# Patient Record
Sex: FEMALE
[De-identification: names, ages, dates, MRNs, and addresses within clinical notes are randomized per-mention and may not be internally consistent; named-entity substitution may affect disease eponyms.]

---

## 2017-03-27 ENCOUNTER — HOSPITAL ENCOUNTER (OUTPATIENT)
Dept: HOSPITAL 42 - ED | Age: 72
Setting detail: OBSERVATION
LOS: 1 days | Discharge: ANOTHER HOSPITAL | End: 2017-03-28
Attending: INTERNAL MEDICINE | Admitting: INTERNAL MEDICINE
Payer: COMMERCIAL

## 2017-03-27 VITALS — OXYGEN SATURATION: 98 %

## 2017-03-27 VITALS — BODY MASS INDEX: 28.3 KG/M2

## 2017-03-27 DIAGNOSIS — F41.9: ICD-10-CM

## 2017-03-27 DIAGNOSIS — I34.0: Primary | ICD-10-CM

## 2017-03-27 DIAGNOSIS — I48.2: ICD-10-CM

## 2017-03-27 DIAGNOSIS — R06.09: ICD-10-CM

## 2017-03-27 DIAGNOSIS — Z79.01: ICD-10-CM

## 2017-03-27 DIAGNOSIS — I25.10: ICD-10-CM

## 2017-03-27 DIAGNOSIS — I50.9: ICD-10-CM

## 2017-03-27 DIAGNOSIS — M79.7: ICD-10-CM

## 2017-03-27 DIAGNOSIS — D64.9: ICD-10-CM

## 2017-03-27 DIAGNOSIS — I11.0: ICD-10-CM

## 2017-03-27 DIAGNOSIS — I27.2: ICD-10-CM

## 2017-03-27 DIAGNOSIS — Z79.82: ICD-10-CM

## 2017-03-27 DIAGNOSIS — N28.9: ICD-10-CM

## 2017-03-27 DIAGNOSIS — K76.1: ICD-10-CM

## 2017-03-27 LAB
ADD MANUAL DIFF?: NO
ALBUMIN/GLOB SERPL: 1.2 {RATIO} (ref 1.1–1.8)
ALP SERPL-CCNC: 192 U/L (ref 38–133)
ALT SERPL-CCNC: 299 U/L (ref 7–56)
APPEARANCE UR: CLEAR
APTT BLD: 28.6 SECONDS (ref 23.7–30.8)
AST SERPL-CCNC: 242 U/L (ref 15–39)
BASOPHILS # BLD AUTO: 0.03 K/MM3 (ref 0–2)
BASOPHILS NFR BLD: 0.6 % (ref 0–3)
BILIRUB SERPL-MCNC: 0.5 MG/DL (ref 0.2–1.3)
BILIRUB UR-MCNC: NEGATIVE MG/DL
BUN SERPL-MCNC: 26 MG/DL (ref 7–21)
CALCIUM SERPL-MCNC: 8.5 MG/DL (ref 8.4–10.5)
CHLORIDE SERPL-SCNC: 104 MMOL/L (ref 98–107)
CO2 SERPL-SCNC: 22 MMOL/L (ref 21–33)
COLOR UR: YELLOW
EOSINOPHIL # BLD: 0.2 10*3/UL (ref 0–0.7)
EOSINOPHIL NFR BLD: 3.2 % (ref 1.5–5)
EPI CELLS #/AREA URNS HPF: (no result) /HPF (ref 0–5)
ERYTHROCYTE [DISTWIDTH] IN BLOOD BY AUTOMATED COUNT: 14.8 % (ref 11.5–14.5)
GLOBULIN SER-MCNC: 3 GM/DL
GLUCOSE SERPL-MCNC: 113 MG/DL (ref 70–110)
GLUCOSE UR STRIP-MCNC: NEGATIVE MG/DL
GRANULOCYTES # BLD: 2.92 10*3/UL (ref 1.4–6.5)
GRANULOCYTES NFR BLD: 57.7 % (ref 50–68)
HCT VFR BLD CALC: 28.1 % (ref 36–48)
INR PPP: 1.5 (ref 0.93–1.08)
KETONES UR STRIP-MCNC: NEGATIVE MG/DL
LEUKOCYTE ESTERASE UR-ACNC: NEGATIVE LEU/UL
LYMPHOCYTES # BLD: 1.5 10*3/UL (ref 1.2–3.4)
LYMPHOCYTES NFR BLD AUTO: 29.6 % (ref 22–35)
MAGNESIUM SERPL-MCNC: 2 MG/DL (ref 1.7–2.2)
MCH RBC QN AUTO: 27.7 PG (ref 25–35)
MCHC RBC AUTO-ENTMCNC: 32.7 G/DL (ref 31–37)
MCV RBC AUTO: 84.6 FL (ref 80–105)
MONOCYTES # BLD AUTO: 0.5 10*3/UL (ref 0.1–0.6)
MONOCYTES NFR BLD: 8.9 % (ref 1–6)
PH UR STRIP: 6 [PH] (ref 4.7–8)
PLATELET # BLD: 201 10^3/UL (ref 120–450)
PMV BLD AUTO: 10 FL (ref 7–11)
POTASSIUM SERPL-SCNC: 4.5 MMOL/L (ref 3.6–5)
PROT SERPL-MCNC: 6.5 G/DL (ref 5.8–8.3)
PROT UR STRIP-MCNC: (no result) MG/DL
RBC # UR STRIP: NEGATIVE /UL
RBC #/AREA URNS HPF: (no result) /HPF (ref 0–2)
SODIUM SERPL-SCNC: 136 MMOL/L (ref 132–148)
SP GR UR STRIP: 1.01 (ref 1–1.03)
TROPONIN I SERPL-MCNC: 0.02 NG/ML
UROBILINOGEN UR STRIP-ACNC: 0.2 E.U./DL
WBC # BLD AUTO: 5.1 10^3/UL (ref 4.5–11)
WBC #/AREA URNS HPF: (no result) /HPF (ref 0–6)

## 2017-03-27 PROCEDURE — 84443 ASSAY THYROID STIM HORMONE: CPT

## 2017-03-27 PROCEDURE — 84100 ASSAY OF PHOSPHORUS: CPT

## 2017-03-27 PROCEDURE — 83036 HEMOGLOBIN GLYCOSYLATED A1C: CPT

## 2017-03-27 PROCEDURE — 36415 COLL VENOUS BLD VENIPUNCTURE: CPT

## 2017-03-27 PROCEDURE — 83735 ASSAY OF MAGNESIUM: CPT

## 2017-03-27 PROCEDURE — 99284 EMERGENCY DEPT VISIT MOD MDM: CPT

## 2017-03-27 PROCEDURE — 97116 GAIT TRAINING THERAPY: CPT

## 2017-03-27 PROCEDURE — 80061 LIPID PANEL: CPT

## 2017-03-27 PROCEDURE — 85025 COMPLETE CBC W/AUTO DIFF WBC: CPT

## 2017-03-27 PROCEDURE — 93306 TTE W/DOPPLER COMPLETE: CPT

## 2017-03-27 PROCEDURE — 85610 PROTHROMBIN TIME: CPT

## 2017-03-27 PROCEDURE — 71010: CPT

## 2017-03-27 PROCEDURE — 83880 ASSAY OF NATRIURETIC PEPTIDE: CPT

## 2017-03-27 PROCEDURE — 84484 ASSAY OF TROPONIN QUANT: CPT

## 2017-03-27 PROCEDURE — 83615 LACTATE (LD) (LDH) ENZYME: CPT

## 2017-03-27 PROCEDURE — 81001 URINALYSIS AUTO W/SCOPE: CPT

## 2017-03-27 PROCEDURE — 93005 ELECTROCARDIOGRAM TRACING: CPT

## 2017-03-27 PROCEDURE — 82550 ASSAY OF CK (CPK): CPT

## 2017-03-27 PROCEDURE — 84439 ASSAY OF FREE THYROXINE: CPT

## 2017-03-27 PROCEDURE — 80053 COMPREHEN METABOLIC PANEL: CPT

## 2017-03-27 PROCEDURE — 87086 URINE CULTURE/COLONY COUNT: CPT

## 2017-03-27 PROCEDURE — 97162 PT EVAL MOD COMPLEX 30 MIN: CPT

## 2017-03-27 PROCEDURE — 85730 THROMBOPLASTIN TIME PARTIAL: CPT

## 2017-03-27 NOTE — ED PDOC
Arrival/HPI





- General


Chief Complaint: Chest Pain


Time Seen by Provider: 03/27/17 17:36


Historian: Patient





- History of Present Illness


Narrative History of Present Illness (Text): 


03/27/17 17:36


A 71 year old female, whose past medical history includes atrial fibrillation (

on Xarelto), CAD (on plavix), CHF, fibromyalgia, anxiety and leaky value, 

presents to the emergency department complaining of progressively worsening 

weakness for some time but worse two days ago. Patient notes 2 days ago she 

developed intermittent, achy chest pain located across the chest wall. She 

notes and increase in the frequency of shortness of breath. She mentions for 

the past week she has had a non productive cough. Patient denies any fever, or 

other complaints at this time. 





PMD: Dr. Castillo


Time/Duration: Other (sometimes now but worsening over the past 2 days)


Symptom Onset: Gradual


Symptom Course: Worsening


Quality: Aching


Activities at Onset: Rest


Context: Home





Past Medical History





- Provider Review


Nursing Documentation Reviewed: Yes





- Cardiac


Hx Atrial Fibrillation: Yes


Other/Comment: "leaky valve"





- Musculoskeletal/Rheumatological


Hx Osteoporosis: Yes





- Psychiatric


Hx Substance Use: No





- Surgical History


Hx Hysterectomy: Yes





Family/Social History





- Physician Review


Nursing Documentation Reviewed: Yes


Family/Social History: No Known Family HX


Smoking Status: Unknown If Ever Smoked


Hx Alcohol Use: No


Hx Substance Use: No





Allergies/Home Meds


Allergies/Adverse Reactions: 


Allergies





No Known Allergies Allergy (Verified 03/27/17 17:38)


 








Home Medications: 


 Home Meds











 Medication  Instructions  Recorded  Confirmed


 


Amiodarone [Cordarone] 200 mg PO DAILY 03/27/17 03/27/17


 


Aspirin [Grant Aspirin] 81 mg PO DAILY 03/27/17 03/27/17


 


Clopidogrel [Plavix] 75 mg PO DAILY 03/27/17 03/27/17


 


Gabapentin [Neurontin] 800 mg PO TID 03/27/17 03/27/17


 


Metoprolol Tartrate [Lopressor] 50 mg PO BID 03/27/17 03/27/17


 


Pantoprazole [Protonix] 40 mg PO DAILY 03/27/17 03/27/17


 


Pentoxifylline [Pentoxifylline] 400 mg PO TID 03/27/17 03/27/17


 


Rivaroxaban [Xarelto] 20 mg PO DAILY 03/27/17 03/27/17


 


diltiaZEM CD [Cardizem CD] 180 mg PO DAILY 03/27/17 03/27/17














Review of Systems





- Physician Review


All systems were reviewed & negative as marked: Yes





- Review of Systems


Constitutional: Fatigue.  absent: Fevers


Respiratory: SOB, Cough.  absent: Sputum


Cardiovascular: Chest Pain





Physical Exam


Vital Signs Reviewed: Yes


Pulse: Regular


Respiratory Rate: Normal


Appearance: Positive for: Non-Toxic


Pain Distress: None


Mental Status: Positive for: Alert and Oriented X 3





- Systems Exam


Head: Present: Atraumatic, Normocephalic


Pupils: Present: PERRL


Extroacular Muscles: Present: EOMI


Conjunctiva: Present: Normal


Mouth: Present: Moist Mucous Membranes


Neck: Present: Normal Range of Motion


Respiratory/Chest: Present: Clear to Auscultation, Good Air Exchange.  No: 

Respiratory Distress, Accessory Muscle Use


Cardiovascular: Present: Regular Rate and Rhythm, Normal S1, S2.  No: Murmurs


Abdomen: Present: Normal Bowel Sounds.  No: Tenderness, Distention, Peritoneal 

Signs


Back: Present: Normal Inspection


Upper Extremity: Present: Normal Inspection.  No: Cyanosis, Edema


Lower Extremity: Present: Edema (+1 edema bilaterally).  No: CALF TENDERNESS


Neurological: Present: GCS=15, CN II-XII Intact, Speech Normal


Skin: Present: Warm, Dry, Normal Color.  No: Rashes


Psychiatric: Present: Alert, Oriented x 3, Normal Insight, Normal Concentration





Medical Decision Making


ED Course and Treatment: 


03/27/17 17:36


Impression:


A 71 year old female with worsening weakness, shortness of breath and chest 

pain. 





Differential Diagnosis included but are not limited to: ACS vs. CHF vs. 

Pneumonia





Plan:


-- EKG


-- Chest X-ray


-- Labs


-- Urinalysis 


-- Aspirin


-- Reassess and disposition





Progress Notes:














- Lab Interpretations


Lab Results: 








 03/27/17 18:05 





 03/27/17 18:05 





 Lab Results





03/27/17 18:05: WBC 5.1, RBC 3.32 L, Hgb 9.2 L, Hct 28.1 L, MCV 84.6, MCH 27.7, 

MCHC 32.7, RDW 14.8 H, Plt Count 201, MPV 10.0, Gran % 57.7, Lymph % (Auto) 29.6

, Mono % (Auto) 8.9 H, Eos % (Auto) 3.2, Baso % (Auto) 0.6, Gran # 2.92, Lymph 

# 1.5, Mono # 0.5, Eos # 0.2, Baso # 0.03, PT 16.2 H, INR 1.50 H, APTT 28.6, 

Sodium 136, Potassium 4.5, Chloride 104, Carbon Dioxide 22, Anion Gap 15, BUN 

26 H, Creatinine 1.2, Est GFR (African Amer) 53, Est GFR (Non-Af Amer) 44, 

Random Glucose 113 H, Calcium 8.5, Magnesium 2.0, Total Bilirubin 0.5,  H

,  H, Alkaline Phosphatase 192 H, Lactate Dehydrogenase 1528 H, Total 

Creatine Kinase 86, Troponin I 0.02, NT-Pro-B Natriuret Pep 2150 H, Total 

Protein 6.5, Albumin 3.5, Globulin 3.0, Albumin/Globulin Ratio 1.2








I have reviewed the lab results: Yes





- RAD Interpretation


Radiology Orders: 








03/27/17 17:48


CHEST PORTABLE [RAD] Stat 














- Medication Orders


Current Medication Orders: 











Discontinued Medications





Aspirin (Ecotrin)  162 mg PO STAT STA


   Stop: 03/27/17 17:48


   Last Admin: 03/27/17 18:25  Dose: 162 MG











- Scribe Statement


The provider has reviewed the documentation as recorded by the Lynne Humphreys





Provider Scribe Attestation:


All medical record entries made by the Jamesibkaty were at my direction and 

personally dictated by me. I have reviewed the chart and agree that the record 

accurately reflects my personal performance of the history, physical exam, 

medical decision making, and the department course for this patient. I have 

also personally directed, reviewed, and agree with the discharge instructions 

and disposition.








Disposition/Present on Arrival





- Present on Arrival


Any Indicators Present on Arrival: No


History of DVT/PE: No


History of Uncontrolled Diabetes: No


Urinary Catheter: No


History of Decub. Ulcer: No


History Surgical Site Infection Following: None





- Disposition


Have Diagnosis and Disposition been Completed?: Yes


Diagnosis: 


 Congestive heart failure, Chest pain


Disposition Time: 18:45


Patient Plan: Observation, Telemetry


Condition: FAIR


Discharge Instructions (ExitCare):  Heart Failure (ED), Chest Pain (ED)

## 2017-03-28 VITALS
RESPIRATION RATE: 19 BRPM | HEART RATE: 53 BPM | DIASTOLIC BLOOD PRESSURE: 64 MMHG | TEMPERATURE: 98.9 F | SYSTOLIC BLOOD PRESSURE: 124 MMHG

## 2017-03-28 LAB
ADD MANUAL DIFF?: NO
ALBUMIN/GLOB SERPL: 1.1 {RATIO} (ref 1.1–1.8)
ALP SERPL-CCNC: 165 U/L (ref 38–133)
ALT SERPL-CCNC: 242 U/L (ref 7–56)
AST SERPL-CCNC: 179 U/L (ref 15–39)
BASOPHILS # BLD AUTO: 0.02 K/MM3 (ref 0–2)
BASOPHILS NFR BLD: 0.4 % (ref 0–3)
BILIRUB SERPL-MCNC: 0.5 MG/DL (ref 0.2–1.3)
BUN SERPL-MCNC: 22 MG/DL (ref 7–21)
CALCIUM SERPL-MCNC: 8.3 MG/DL (ref 8.4–10.5)
CHLORIDE SERPL-SCNC: 106 MMOL/L (ref 98–107)
CHOLEST SERPL-MCNC: 130 MG/DL (ref 130–200)
CO2 SERPL-SCNC: 24 MMOL/L (ref 21–33)
EOSINOPHIL # BLD: 0.2 10*3/UL (ref 0–0.7)
EOSINOPHIL NFR BLD: 3.2 % (ref 1.5–5)
ERYTHROCYTE [DISTWIDTH] IN BLOOD BY AUTOMATED COUNT: 14.6 % (ref 11.5–14.5)
GLOBULIN SER-MCNC: 2.8 GM/DL
GLUCOSE SERPL-MCNC: 95 MG/DL (ref 70–110)
GRANULOCYTES # BLD: 2.64 10*3/UL (ref 1.4–6.5)
GRANULOCYTES NFR BLD: 53.1 % (ref 50–68)
HCT VFR BLD CALC: 26.9 % (ref 36–48)
LYMPHOCYTES # BLD: 1.6 10*3/UL (ref 1.2–3.4)
LYMPHOCYTES NFR BLD AUTO: 31.5 % (ref 22–35)
MAGNESIUM SERPL-MCNC: 2 MG/DL (ref 1.7–2.2)
MCH RBC QN AUTO: 27.7 PG (ref 25–35)
MCHC RBC AUTO-ENTMCNC: 32.7 G/DL (ref 31–37)
MCV RBC AUTO: 84.6 FL (ref 80–105)
MONOCYTES # BLD AUTO: 0.6 10*3/UL (ref 0.1–0.6)
MONOCYTES NFR BLD: 11.8 % (ref 1–6)
PHOSPHATE SERPL-MCNC: 4.4 MG/DL (ref 2.5–4.5)
PLATELET # BLD: 180 10^3/UL (ref 120–450)
PMV BLD AUTO: 9.9 FL (ref 7–11)
POTASSIUM SERPL-SCNC: 4.3 MMOL/L (ref 3.6–5)
PROT SERPL-MCNC: 5.9 G/DL (ref 5.8–8.3)
SODIUM SERPL-SCNC: 138 MMOL/L (ref 132–148)
T4 FREE SERPL-MCNC: 1.57 NG/DL (ref 0.78–2.19)
TSH SERPL-ACNC: 3.74 MIU/ML (ref 0.46–4.68)
WBC # BLD AUTO: 5 10^3/UL (ref 4.5–11)

## 2017-03-28 NOTE — CARD
--------------- APPROVED REPORT --------------





EKG Measurement

Heart [id number]

MS [id number]

[id number]

[id number]

[id number]



<Conclusion>

Sinus bradycardia

Otherwise normal ECG

## 2017-03-28 NOTE — HP
HISTORY OF PRESENT ILLNESS:  The patient is a 71-year-old  female who came to Emergency Room 
complaining of increasing shortness of breath, feeling weak, tired, having some chest pressure.  Has 
been going on for the last 2 to 3 days.  It got worse today.  She complained of chest discomfort acro
ss the chest wall along with shortness of breath.  Denies any fever or chills.  She did have a produc
tive cough almost a week or 2 ago, but did not have fever.  No hemoptysis, no hematemesis.



PAST MEDICAL HISTORY:  Significant for:

1.  Chronic AFib, on Xarelto.

2.  Coronary artery disease.

3.  History of congestive heart failure.

4.  Anxiety disorder.

5.  History of fibromyalgia.



ALLERGIES:  She is not allergic to any medications.



MEDICATIONS AT HOME:  She is on:

1.  Pentoxifylline.   

2.  Neurontin 800 three times a day.

3.  Xarelto 20 mg daily.

4.  Plavix 75 mg daily.

5.  Metoprolol 50 mg twice a day.

6.  Diltiazem 180 daily.

7.  Protonix 40 daily.

8.  Aspirin 81 daily.

9.  Amiodarone 200 daily.



SOCIAL HISTORY:  Denies smoking, drinking or alcohol use.



REVIEW OF SYSTEMS:  Significant for generalized weakness, shortness of breath, chest tightness off an
d on, cough and congestion.



PHYSICAL EXAMINATION:

GENERAL:  She is awake and alert, communicative.

VITAL SIGNS:  She is afebrile, pulse 52, respirations 20, blood pressure 120/63.

LUNGS:  Bilateral basal soft crackle.

HEART:  S1, S2 audible.

ABDOMEN:  Soft, nontender.  No rebound, no guarding.

NEUROLOGIC:  The patient is awake and alert, communicative.



LABORATORY DATA:  WBC is 5.1, hemoglobin 9.2, hematocrit 28, platelet 201.  PT 16.2, INR 1.50, PTT 28
.6.  Chemistry:  Sodium 136, potassium 4.5, chloride 104, CO2 of 22, BUN 26, creatinine 1.2, blood lennon
gar of 113.  , , alkaline phosphatase 192.  LDH is 1528.  BNP is 2150.  



X-ray chest shows venous congestion.



ASSESSMENT:

1.  Chest pain, rule out coronary artery disease.

2.  Congestive heart failure.

3.  History of chronic atrial fibrillation with acute and chronic atrial fibrillation.

4.  Abnormal liver function tests, probably passive congestion, rule out cholelithiasis.

5.  Abnormal liver function tests.

6.  Mild renal insufficiency.  

7.  Chronic anemia.



PLAN:  The patient will be admitted on telemetry.  Will start diuresis.  Will resume her medication. 
 Cardiology consult by Dr. Contreras.  Echocardiogram will be ordered and I will also order for abdominal 
sonogram.  Will reevaluate the patient in the a.m.





__________________________________________

Ld Sarkar MD







cc:



DD: 03/27/2017 19:33:56  [id number]

TT: 03/28/2017 06:31:02

Job # [id number]

mn

## 2017-03-28 NOTE — CARD
--------------- APPROVED REPORT --------------





EXAM: Two-dimensional and M-mode echocardiogram with Doppler and 

color Doppler.



INDICATION

Chest Pain Congestive Heart Failure 



2D DIMENSIONS 

Left Atrium (2D)4.8   (1.6-4.0cm)IVSd1.2   (0.7-1.1cm)

LVDd5.5   (3.9-5.9cm)PWd1.1   (0.7-1.1cm)

LVDs3.7   (2.5-4.0cm)FS (%) 33.4   %

LVEF (%)61.5   (>50%)



M-Mode DIMENSIONS 

Aortic Root2.40   (2.2-3.7cm)Aortic Cusp Exc.1.30   (1.5-2.0cm)



Aortic Valve

AoV Peak Ksnxmibd628.0cm/Kacey Peak GR.16mmHg



Mitral Valve

E/A ratio0.0



TDI

E/Lateral E'0.0E/Medial E'0.0



Tricuspid Valve

TR Peak [id number]/sRAP [id number] Peak Gr.58mmHg

[id number]



 LEFT VENTRICLE 

The left ventricle is normal size. There is mild concentric left 

ventricular hypertrophy. The left ventricular function is 

normal.EF-55-60% There is normal LV segmental wall motion. 

Transmitral Doppler flow pattern is Grade III-reversible restrictive 

diastolic dysfunction. No left ventricle thrombus noted on this 

study. There is no ventricular septal defect visualized. There is no 

left ventricular aneurysm.



 RIGHT VENTRICLE 

The right ventricle is normal size. There is normal right ventricular 

wall thickness. The right ventricular systolic function is normal.



 ATRIA 

The left atrium is mildly dilated. The right atrium is mildly 

dilated. The interatrial septum is intact with no evidence for an 

atrial septal defect.



 AORTIC VALVE 

The aortic valve is calcified but opens well. The aortic valve is 

moderately sclerotic. There is trace aortic regurgitation. Aortic 

sclerosis Vs Mild AS



 MITRAL VALVE 

The mitral valve is thickened but opens well. Mitral regurgitation is 

severe. There is no mitral valve stenosis. There is no evidence of 

mitral valve prolapse.



 TRICUSPID VALVE 

The tricuspid valve leaflets are thickened , but open well. There is 

moderate to severe tricuspid regurgitation.[id number] There is moderate 

pulmonary hypertension. There is no tricuspid valve stenosis. There 

is no tricuspid valve prolapse or vegetation.



 PULMONIC VALVE 

The pulmonic valve is mildly thickened. There is trace pulmonic 

valvular regurgitation. There is no pulmonic valvular stenosis.



 GREAT VESSELS 

The aortic root is normal in size. The ascending aorta is normal in 

size. The pulmonary artery is normal. The IVC is normal in size and 

collapses >50% with inspiration.



 PERICARDIAL EFFUSION 

There is no pericardial effusion.



<Conclusion>

The left ventricle is normal size.

There is mild concentric left ventricular hypertrophy.

The left ventricular function is normal.EF-55-60%

There is trace aortic regurgitation.

Aortic sclerosis Vs Mild AS

Mitral regurgitation is severe.

There is moderate to severe tricuspid regurgitation.[id number]

There is moderate pulmonary hypertension.

There is no pericardial effusion.

The IVC is normal in size and collapses >50% with inspiration.

No vegetation or thrombus noted.

## 2017-03-28 NOTE — RAD
HISTORY:

chest pain, cough r/o pna  



COMPARISON:

No prior. 



FINDINGS:



LUNGS:

The lungs are well inflated.  There is bibasilar airspace disease.



PLEURA:

No significant pleural effusion identified, no pneumothorax apparent.



CARDIOVASCULAR:

Normal.



OSSEOUS STRUCTURES:

No significant abnormalities.



VISUALIZED UPPER ABDOMEN:

Normal.



OTHER FINDINGS:

None.



IMPRESSION:

Bibasilar airspace disease may represent subsegmental atelectasis 

however superimposed pneumonia cannot be excluded.  Follow-up PA and 

lateral radiographs are recommended to ensure complete resolution.

## 2017-03-28 NOTE — CON
DATE: 03/28/2017



REASON FOR CONSULTATION:  Shortness of breath, cardiac evaluation, history of atrial fibrillation, hi
story of mitral regurgitation.



BRIEF CLINICAL HISTORY:  A 71-year-old Chadian speaking female came to the Emergency Room.  Recently 
came from [place].  Information obtained from the daughter, Federica, telephone #[phone].  Expl
ained the patient has atrial fibrillation, coronary artery disease and possible mitral regurgitation.
  Being followed by Dr. Brito, and is scheduled for cardiac catheterization at [place] and open hea
rt at [facility]. Yesterday, patient was at home.  The patient's son-in-law called the daughter germaine queen she was feeling very weak, so brought to the Emergency Room.  Denies any chest pain now, but yeste
rday she had chest pain and shortness of breath as well.



PAST MEDICAL HISTORY:  As mentioned, significant for atrial fibrillation, coronary artery disease, mi
tral regurgitation, congestive heart failure, and fibromyalgia.



PAST SURGICAL HISTORY:  Not obtainable.



CURRENT MEDICATIONS:  Pentoxifylline, Neurontin, Xarelto, Plavix, metoprolol, Cardizem, Protonix, asp
irin, amiodarone.



SOCIAL HISTORY:  Denies smoking.  Denies any history of alcohol abuse.



REVIEW OF SYSTEMS:  As per HPI.



PHYSICAL EXAMINATION:

VITAL SIGNS:  Temperature afebrile, heart rate 52, blood pressure 125/68.

HEENT:  PERRLA. Extraocular muscles intact.

NECK:  Supple.  No carotid bruit or thyromegaly.

CHEST:  Clear to auscultation.

HEART:  S1, S2 regular.

ABDOMEN:  Soft.

EXTREMITIES:  Clubbing and cyanosis negative.



LABORATORY DATA:  Blood workup as follows:  WBC 5.0, hemoglobin 8.8, hematocrit 26.9, platelet count 
180.  Chemistry shows sodium 138, potassium 4.3, chloride 106, carbon dioxide 24, anion gap of 12, BU
N 22, creatinine 1.2.  Troponin 0.02.  TSH 3.74.  Triglyceride 90, cholesterol 160, LDL 66, HDL 34.  
BNP 2150.  EKG shows possible sinus irene.



IMPRESSION:  Paroxysmal atrial fibrillation, anemia, decompensated congestive heart failure, severe m
itral regurgitation, coronary artery disease, congestive heart failure possibly secondary to _____ hy
pertension, coronary artery disease.



RECOMMENDATION:  The patient has already been scheduled for cardiac catheterization and possible MVR 
at [facility].  Discussed with the daughter, Federica, telephone number [phone].  Agree to continue
 Xarelto, but patient is on heavy duty blood thinner.  Also history of tinnitus for a long time.  Con
tinue IV Lasix.  Discussed with the patient.  Since all the records and everything is there and the p
atient has already been scheduled, suggest to follow up with [facility] for open heart
 surgery.  We will discuss with Dr. Sarkar.  Once the patient is stable, can be discharged and to fo
llow up with them.  No further cardiac workup is planned at this time, except patient has already bee
n ordered  an echo. We will follow and review the echo when it is done.  Interim, continue amiodarone
 to maintain sinus.  Continue IV Lasix.  Continue Cardizem.  Will follow with you.



Thank you, Dr. Sarkar, for providing the opportunity in taking care of the patient.





__________________________________________

Sonido Contreras MD







cc:



DD: 03/28/2017 09:33:04  [id number]

TT: 03/28/2017 12:07:59

Confirmation # [id number]

Dictation # [id number]

anita

## 2017-03-29 NOTE — DS
The patient was not able to give much history yesterday, but today, I got more detailed history from 
her daughter.  She stated the patient moved here almost a month ago, and she was having complaint of 
palpitation and shortness of breath.  She has been going to different hospital, including ____ [facility].  The daughter knows somebody in [facility], and she made an appointment with cardiologist naldo Burr, and she is scheduled to have a valve repair done on Thursday.  Initial plan was to have
 cardiac cath done.  She has surgery scheduled already for Thursday.  So they opted to transfer her t
o [facility] to have her procedure done - both cardiac cath and possible valve repair.



PHYSICAL EXAMINATION:

GENERAL:  Today, she is awake and alert.  She is communicative.  She is not complaining of shortness 
of breath or palpitations at this point.

VITAL SIGNS:  She is afebrile, pulse 53, respirations 19, blood pressure 124/64.

LUNGS:  Bilateral fair airflow.  No rhonchi or crackle.

HEART:  S1, S2 audible with a systolic murmur.

ABDOMEN:  Soft, nontender.  No rebound, no guarding.

NEUROLOGIC:  She is awake and alert, communicative.



LABORATORY EXAM:  WBC is 5.0, hemoglobin 8.8, hematocrit 26.9, platelets 180.  Chemistry:  Sodium 138
, potassium 4.3, chloride 106, CO2 of 24, BUN 22, creatinine 1.2, blood sugar of 95.  Her , AL
T 242.  Alk phos is 165.  BNP on admission was 2150.  Two sets of troponins are negative.  



She had echocardiogram done that showed concentric LVH.  Left ventricular ejection fraction is 55-60%
, trace aortic regurg, and she has aortic sclerosis and severe mitral regurg, moderate to severe tric
uspid regurg, moderate pulmonary hypertension.



ASSESSMENT:

1.  Exertional dyspnea, probably secondary to severe mitral regurgitation.

2.  Chronic atrial fibrillation.

3.  Hypertension.

4.  Chronic anemia.

5.  Passive hepatic congestion with ____.  Conclusion was to transfer the patient to [facility], and 
that was done, where she will have mitral valve repair done and possible cardiac catheterization done
.  She will follow up with her primary medical doctor, Dr. Castillo.





__________________________________________

Ld Sarkar MD







cc:



DD: 03/28/2017 21:43:45  [id number]

TT: 03/29/2017 10:26:57

Job # [id number]

jn

## 2019-02-08 ENCOUNTER — HOSPITAL ENCOUNTER (INPATIENT)
Dept: HOSPITAL 42 - ED | Age: 74
LOS: 6 days | Discharge: HOME | DRG: 194 | End: 2019-02-14
Attending: INTERNAL MEDICINE | Admitting: INTERNAL MEDICINE
Payer: COMMERCIAL

## 2019-02-08 VITALS — BODY MASS INDEX: 27.8 KG/M2

## 2019-02-08 DIAGNOSIS — I11.0: ICD-10-CM

## 2019-02-08 DIAGNOSIS — Z90.710: ICD-10-CM

## 2019-02-08 DIAGNOSIS — J44.9: ICD-10-CM

## 2019-02-08 DIAGNOSIS — J31.0: ICD-10-CM

## 2019-02-08 DIAGNOSIS — I42.9: ICD-10-CM

## 2019-02-08 DIAGNOSIS — M79.7: ICD-10-CM

## 2019-02-08 DIAGNOSIS — H92.01: ICD-10-CM

## 2019-02-08 DIAGNOSIS — I08.1: ICD-10-CM

## 2019-02-08 DIAGNOSIS — I25.10: ICD-10-CM

## 2019-02-08 DIAGNOSIS — J40: ICD-10-CM

## 2019-02-08 DIAGNOSIS — G47.30: ICD-10-CM

## 2019-02-08 DIAGNOSIS — Z79.82: ICD-10-CM

## 2019-02-08 DIAGNOSIS — J10.1: Primary | ICD-10-CM

## 2019-02-08 DIAGNOSIS — E78.5: ICD-10-CM

## 2019-02-08 DIAGNOSIS — I50.9: ICD-10-CM

## 2019-02-08 DIAGNOSIS — K21.9: ICD-10-CM

## 2019-02-08 DIAGNOSIS — I48.91: ICD-10-CM

## 2019-02-08 DIAGNOSIS — I82.541: ICD-10-CM

## 2019-02-08 DIAGNOSIS — Z95.2: ICD-10-CM

## 2019-02-08 LAB
ALBUMIN SERPL-MCNC: 4.3 G/DL (ref 3–4.8)
ALBUMIN/GLOB SERPL: 1.4 {RATIO} (ref 1.1–1.8)
ALT SERPL-CCNC: 12 U/L (ref 7–56)
APTT BLD: 28.6 SECONDS (ref 26.9–38.3)
AST SERPL-CCNC: 30 U/L (ref 14–36)
BASOPHILS # BLD AUTO: 0.02 K/MM3 (ref 0–2)
BASOPHILS NFR BLD: 0.3 % (ref 0–3)
BUN SERPL-MCNC: 21 MG/DL (ref 7–21)
CALCIUM SERPL-MCNC: 9.1 MG/DL (ref 8.4–10.5)
CK MB SERPL-MCNC: 0.7 NG/ML (ref 0–3.6)
D DIMER PPP FEU-MCNC: 287 NG/MLDDU (ref 0–243)
EOSINOPHIL # BLD: 0 10*3/UL (ref 0–0.7)
EOSINOPHIL NFR BLD: 0.2 % (ref 1.5–5)
ERYTHROCYTE [DISTWIDTH] IN BLOOD BY AUTOMATED COUNT: 14.5 % (ref 11.5–14.5)
GFR NON-AFRICAN AMERICAN: 49
HGB BLD-MCNC: 10.4 G/DL (ref 12–16)
INFLUENZA A B: (no result)
INR PPP: 1.2
LYMPHOCYTES # BLD: 0.6 10*3/UL (ref 1.2–3.4)
LYMPHOCYTES NFR BLD AUTO: 9 % (ref 22–35)
MCH RBC QN AUTO: 26.9 PG (ref 25–35)
MCHC RBC AUTO-ENTMCNC: 32.2 G/DL (ref 31–37)
MCV RBC AUTO: 83.5 FL (ref 80–105)
MONOCYTES # BLD AUTO: 0.5 10*3/UL (ref 0.1–0.6)
MONOCYTES NFR BLD: 8.4 % (ref 1–6)
PLATELET # BLD: 168 10^3/UL (ref 120–450)
PMV BLD AUTO: 9.6 FL (ref 7–11)
PROTHROMBIN TIME: 13.6 SECONDS (ref 9.4–12.5)
RBC # BLD AUTO: 3.87 10^6/UL (ref 3.5–6.1)
TROPONIN I SERPL-MCNC: 0.03 NG/ML
WBC # BLD AUTO: 6.3 10^3/UL (ref 4.5–11)

## 2019-02-08 RX ADMIN — ENOXAPARIN SODIUM SCH MG: 40 INJECTION SUBCUTANEOUS at 14:56

## 2019-02-08 RX ADMIN — ARFORMOTEROL TARTRATE SCH MCG: 15 SOLUTION RESPIRATORY (INHALATION) at 23:52

## 2019-02-08 RX ADMIN — BUDESONIDE SCH MG: 0.25 SUSPENSION RESPIRATORY (INHALATION) at 23:52

## 2019-02-08 RX ADMIN — NAPROXEN SODIUM SCH MG: 550 TABLET ORAL at 17:59

## 2019-02-08 NOTE — ED PDOC
Arrival/HPI





- General


Time Seen by Provider: 02/08/19 09:46


Historian: Patient





- History of Present Illness


Narrative History of Present Illness (Text): 





02/08/19 09:59





a 73 year old female, whose past medical history includes atrial fibrillation, 

CAD, CHF, fibromyalgia, anxiety, mitral and tricuspid valve replacement, 

presents to the emergency department with a complaint of headache, right 

earache, cough, chest pain, and shortness of breath. The patient states that she

is visiting from [place] for the past month. She reports chills but denies 

fevers,  dizziness , dyspnea on exertion, sputum, abdominal pain, nausea, 

vomiting, diarrhea, back pain, neck pain, urinary/bowel changes, or any other 

complaint.








Time/Duration: Other (Yesterday)


Symptom Onset: Sudden


Symptom Course: Unchanged


Activities at Onset: Rest, Light


Context: Home





Past Medical History





- Provider Review


Nursing Documentation Reviewed: Yes





- Cardiac


Hx Congestive Heart Failure: Yes


Hx Hypertension: Yes





- Pulmonary


Hx Respiratory Disorders: No





- Neurological


Hx Neurological Disorder: No





- HEENT


Hx HEENT Disorder: No





- Renal


Hx Renal Disorder: No





- Endocrine/Metabolic


Hx Endocrine Disorders: No





- Integumentary


Hx Dermatological Disorder: No





- Musculoskeletal/Rheumatological


Hx Falls: Yes





- Gastrointestinal


Hx Gastrointestinal Disorders: Yes


Hx Gastroesophageal Reflux: Yes





- Genitourinary/Gynecological


Hx Genitourinary Disorders: No





- Psychiatric


Hx Anxiety: Yes


Hx Substance Use: No





- Surgical History


Hx Hysterectomy: Yes





Family/Social History





- Physician Review


Nursing Documentation Reviewed: Yes


Family/Social History: No Known Family HX


Smoking Status: Never Smoked


Hx Alcohol Use: No


Hx Substance Use: No





Allergies/Home Meds


Allergies/Adverse Reactions: 


Allergies





No Known Allergies Allergy (Verified 02/08/19 10:00)


   








Home Medications: 


                                    Home Meds











 Medication  Instructions  Recorded  Confirmed


 


Aspirin [[place] Aspirin] 81 mg PO DAILY 03/27/17 02/08/19


 


Gabapentin [Neurontin] 800 mg PO TID 03/27/17 02/08/19


 


Metoprolol Tartrate [Lopressor] 50 mg PO BID 03/27/17 02/08/19


 


Atorvastatin [Lipitor] 20 mg PO DAILY 02/08/19 02/08/19


 


Furosemide [Lasix] 20 mg PO DAILY 02/08/19 02/08/19


 


Orphenadrine [Norflex] 100 mg PO DAILY 02/08/19 02/08/19














Review of Systems





- Physician Review


All systems were reviewed & negative as marked: Yes





- Review of Systems


Constitutional: absent: Fevers


ENT: Other (Right earache)


Respiratory: SOB, Cough.  absent: Sputum


Cardiovascular: Chest Pain.  absent: STUBBS


Gastrointestinal: absent: Abdominal Pain, Stool Changes, Diarrhea, Nausea, 

Vomiting


Genitourinary Female: absent: Urine Output Changes


Neurological: absent: Headache, Dizziness





Physical Exam


Vital Signs Reviewed: Yes





Vital Signs











  Temp Pulse Resp BP Pulse Ox


 


 02/08/19 09:45  98.1 F  83  18  123/51 L  98











Temperature: Afebrile


Blood Pressure: Hypotensive


Pulse: Regular


Respiratory Rate: Normal


Appearance: Positive for: Well-Appearing, Non-Toxic, Comfortable


Pain Distress: None


Mental Status: Positive for: Alert and Oriented X 3





- Systems Exam


Head: Present: Atraumatic, Normocephalic


Pupils: Present: PERRL


Extroacular Muscles: Present: EOMI


Conjunctiva: Present: Normal


Mouth: Present: Moist Mucous Membranes


Neck: Present: Normal Range of Motion


Respiratory/Chest: Present: Good Air Exchange, Decreased Breath Sounds.  No: 

Respiratory Distress, Accessory Muscle Use


Cardiovascular: Present: Regular Rate and Rhythm, Normal S1, S2.  No: Murmurs


Abdomen: No: Tenderness, Distention, Peritoneal Signs


Back: Present: Normal Inspection


Upper Extremity: Present: Normal Inspection.  No: Cyanosis, Edema


Lower Extremity: Present: Normal Inspection.  No: Edema


Neurological: Present: GCS=15, CN II-XII Intact, Speech Normal


Skin: Present: Warm, Dry, Normal Color.  No: Rashes


Psychiatric: Present: Alert, Oriented x 3, Normal Insight, Normal Concentration





Medical Decision Making


ED Course and Treatment: 





02/08/19 10:03





Impression:


A 73 year old female presents to the emergency department with a complaint of 

headache, right earache, cough, chest pain, and shortness of breath. 





Plan:


-- EKG


-- Chest X-ray 


-- Blood Culture


-- Labs


-- Reassess and disposition








Progress Notes:








Chest X-ray 


Dictator : Dale Presley MD


Report Date : 02/08/2019 10:35:05


IMPRESSION: No active disease.








02/08/19 11:04: Discussed case with Dr. Dangelo who accepts patient to her 

service for Tele admission. Requested Dr. Flannery and  Dr. Glass for admission. 





02/08/19 11:32


EKG shows normal sinus rhythm rate approximately 85 with no acute ST or T-wave 

changes.





- Lab Interpretations


I have reviewed the lab results: Yes





- EKG Interpretation


Interpreted by ED Physician: Yes


Type: 12 lead EKG





- Scribe Statement


The provider has reviewed the documentation as recorded by the Scribe





Neda Cerna 





Provider Scribe Attestation:


All medical record entries made by the Scribe were at my direction and per

sonally dictated by me. I have reviewed the chart and agree that the record 

accurately reflects my personal performance of the history, physical exam, 

medical decision making, and the department course for this patient. I have also

 personally directed, reviewed, and agree with the discharge instructions and 

disposition.








Disposition/Present on Arrival





- Present on Arrival


Any Indicators Present on Arrival: No


History of DVT/PE: No


History of Uncontrolled Diabetes: No


Urinary Catheter: No


History of Decub. Ulcer: No


History Surgical Site Infection Following: None





- Disposition


Have Diagnosis and Disposition been Completed?: Yes


Diagnosis: 


 Congestive heart failure, Influenza





Disposition: HOSPITALIZED


Disposition Time: 11:13


Patient Plan: Observation, Telemetry


Patient Problems: 


                             Current Active Problems











Problem Status Onset


 


Congestive heart failure Acute 


 


Influenza Acute 











Condition: FAIR

## 2019-02-08 NOTE — RAD
Date of service: 



02/08/2019



HISTORY:

 cp 



COMPARISON:

03/27/2017 



FINDINGS:



LUNGS:

No active pulmonary disease.



PLEURA:

No significant pleural effusion identified, no pneumothorax apparent.



CARDIOVASCULAR:

Aortic calcification



Normal cardiac size. No pulmonary vascular congestion. 



OSSEOUS STRUCTURES:

Sternal wires



VISUALIZED UPPER ABDOMEN:

Normal.



OTHER FINDINGS:

None.



IMPRESSION:

No active disease.

## 2019-02-08 NOTE — CON
DATE:  02/08/2019



PULMONARY CONSULT



REFERRING PHYSICIAN:  Veronique Dangelo MD



REASON FOR CONSULT:  Cough, short of breath, influenza positive.



HISTORY OF PRESENT ILLNESS:  This is 73 years old female with known history

of atrial fibrillation, coronary artery disease, history of heart failure,

fibromyalgia, history of valvular heart disease, and valve replacement in

the remote past.  Recently traveled to [place], came back about 6 weeks

or so, been doing okay.  In [place], she was seen by physician and was

given inhaled bronchodilator with some shortness of breath.  According to

her, she has been getting short of breath for a while now, but from the

last 3 days has been having rhinitis, cough, body aches, pain, comes to ER,

had a influenza test done according to ER which was positive.  She was

given Tamiflu.  Presently complaining of aches and pains, cough.  No

nausea.  No vomiting.  No abdominal pain.  No dysuria, leg pain or leg

swelling.



PAST MEDICAL HISTORY:  As per history of present illness.



FAMILY HISTORY:  No significant cardiopulmonary disease reported.



SOCIAL HISTORY:  No history of smoking or alcohol use.



ALLERGIES:  NONE KNOWN.



MEDICATIONS:  As outpatient, she is on aspirin, gabapentin, metoprolol

tartrate, Lipitor, Lasix, and Norflex.  She was given Tamiflu in the ER.



REVIEW OF SYSTEMS:  Had headache, rhinitis, cough, shortness of breath.  No

chest pain.  No nausea, no vomiting, no diarrhea.  Has body aches and

pains.



PHYSICAL EXAMINATION

GENERAL:  Lying in the bed.

VITAL SIGNS:  Temperature is 99, heart rate 75, respiratory rate is 18,

blood pressure 113/80, pulse ox 97% on room air.

HEENT:  Moist mucous membranes.  Crowded airway.

NECK:  Supple.  No JVD.

LUNGS:  Has scattered rhonchi.

HEART:  S1 and S2.

ABDOMEN:  Soft, nontender.  No organomegaly.

EXTREMITIES:  No edema.

NEUROLOGIC:  Awake, alert; follows simple commands.



LABORATORY DATA:  Shows hemoglobin 10.4, hematocrit 33.3, WBC is 6.3,

platelet is 168.  INR 1.20.  PTT 29.  D-dimer is 287.  Sodium 132,

potassium 3.8, chloride 98, bicarbonate 23, BUN 21, creatinine 1.1, calcium

is 9.1, magnesium 1.6, AST 30, ALT 12, alk phos is 66.  Troponin 0.03,

proBNP 5330, albumin is 4.3.  Influenza A is positive.  Group A beta strep

is negative.  Chest x-ray done shows no active pulmonary disease.



IMPRESSION AND PLAN:  Influenza A infection, probably triggering _____

bronchitis and also probably triggering heart failure, history of

fibromyalgia, history of valvular heart disease, _____ diagnosis of atrial

fibrillation.  Pulmonary point of view, we will add inhaled bronchodilator,

gastric prophylaxis, deep venous thrombosis prophylaxis, Tamiflu 75 mg

daily.  We will get venous Doppler of lower extremity.  Recently traveled

about 4-6 weeks ago.  Also, we will get echocardiogram and assess right

ventricular and left ventricular function.  Has history of valvular heart

disease with coronary artery disease and atrial fibrillation.  Present EKG

shows sinus rhythm.  She is not on any anticoagulation.  We will give DVT

prophylaxis for now.  We will also add Naprosyn for antiinflammatory. 

Continue her gabapentin for fibromyalgia.  We will do followup labs in the

morning.  She may need outpatient sleep study because of her cardiomyopathy

and AFib and crowded airway.



Thank you and we will follow with you.







__________________________________________

Sonido Glass MD





DD:  02/08/2019 14:45:08

DT:  02/08/2019 15:51:31

Job # [id number]

## 2019-02-08 NOTE — CARD
--------------- APPROVED REPORT --------------





Date of service: 02/08/2019



EKG Measurement

Heart [id number]

MD [id number]

[id number]

[id number]

[id number]



<Conclusion>

Normal sinus rhythm

Normal ECG

## 2019-02-09 LAB
% IRON SATURATION: 7 % (ref 20–55)
ALBUMIN SERPL-MCNC: 4.3 G/DL (ref 3–4.8)
ALBUMIN/GLOB SERPL: 1.2 {RATIO} (ref 1.1–1.8)
ALT SERPL-CCNC: 15 U/L (ref 7–56)
APPEARANCE UR: CLEAR
AST SERPL-CCNC: 38 U/L (ref 14–36)
BACTERIA #/AREA URNS HPF: (no result) /HPF
BILIRUB UR-MCNC: NEGATIVE MG/DL
BUN SERPL-MCNC: 31 MG/DL (ref 7–21)
CALCIUM SERPL-MCNC: 9 MG/DL (ref 8.4–10.5)
COLOR UR: YELLOW
ERYTHROCYTE [DISTWIDTH] IN BLOOD BY AUTOMATED COUNT: 14.7 % (ref 11.5–14.5)
FOLATE SERPL-MCNC: > 20 NG/ML
GFR NON-AFRICAN AMERICAN: 34
GLUCOSE UR STRIP-MCNC: NEGATIVE MG/DL
HGB BLD-MCNC: 11.5 G/DL (ref 12–16)
IRON SERPL-MCNC: 20 UG/DL (ref 45–180)
LEUKOCYTE ESTERASE UR-ACNC: (no result) LEU/UL
MCH RBC QN AUTO: 26.7 PG (ref 25–35)
MCHC RBC AUTO-ENTMCNC: 31.6 G/DL (ref 31–37)
MCV RBC AUTO: 84.5 FL (ref 80–105)
PH UR STRIP: 5.5 [PH] (ref 4.7–8)
PLATELET # BLD: 172 10^3/UL (ref 120–450)
PMV BLD AUTO: 9.4 FL (ref 7–11)
PROT UR STRIP-MCNC: NEGATIVE MG/DL
RBC # BLD AUTO: 4.31 10^6/UL (ref 3.5–6.1)
RBC # UR STRIP: NEGATIVE /UL
SP GR UR STRIP: 1.02 (ref 1–1.03)
TIBC SERPL-MCNC: 287 UG/DL (ref 265–497)
UROBILINOGEN UR STRIP-ACNC: 0.2 E.U./DL
VIT B12 SERPL-MCNC: 328 PG/ML (ref 239–931)
WBC # BLD AUTO: 3.3 10^3/UL (ref 4.5–11)

## 2019-02-09 RX ADMIN — BUDESONIDE SCH MG: 0.25 SUSPENSION RESPIRATORY (INHALATION) at 07:52

## 2019-02-09 RX ADMIN — ENOXAPARIN SODIUM SCH MG: 40 INJECTION SUBCUTANEOUS at 09:24

## 2019-02-09 RX ADMIN — BUDESONIDE SCH MG: 0.25 SUSPENSION RESPIRATORY (INHALATION) at 20:12

## 2019-02-09 RX ADMIN — FUROSEMIDE SCH MG: 40 SOLUTION ORAL at 09:23

## 2019-02-09 RX ADMIN — ARFORMOTEROL TARTRATE SCH MCG: 15 SOLUTION RESPIRATORY (INHALATION) at 07:52

## 2019-02-09 RX ADMIN — NAPROXEN SODIUM SCH MG: 550 TABLET ORAL at 18:28

## 2019-02-09 RX ADMIN — NAPROXEN SODIUM SCH MG: 550 TABLET ORAL at 09:24

## 2019-02-09 RX ADMIN — ARFORMOTEROL TARTRATE SCH MCG: 15 SOLUTION RESPIRATORY (INHALATION) at 20:12

## 2019-02-09 NOTE — HP
DATE OF EXAM:  02/09/2019



HISTORY OF PRESENT ILLNESS:  The patient is a 73-year-old female with past

medical history of coronary artery disease, congestive heart failure,

fibromyalgia, anxiety, mitral and tricuspid valve replacement, came to

emergency department with complaining of headache, right earache, cough,

chest pain, shortness of breath.  The patient states that she is visiting

from [place] for the past month.  She reports chills, but denies

fevers, dizziness, dyspnea on exertion, sputum, abdominal pain.  No nausea,

vomiting, or diarrhea.  No urinary symptoms.



PAST MEDICAL HISTORY:  Congestive heart failure, hypertension, 

disease, and anxiety.



FAMILY HISTORY:  Father and mother, noncontributory.



HABITS:  No smoking.  No drugs.  No ethanol.



ALLERGIES:  THE PATIENT IS NOT ALLERGIC WITH ANY MEDICATIONS.



HOME MEDICATIONS:  Aspirin, Neurontin, Lopressor, Lipitor, Lasix, Norflex.



REVIEW OF SYSTEMS:  The patient was seen and examined at bedside in the ER

looking comfortable.  At that moment, no fever, no chills, no hematochezia.

Complaining of shortness of breath, coughing, chest pain.  No headache or

dizziness.  No urinary output changes.  No abdominal pain, stool changes,

diarrhea, nausea, or vomiting.



PHYSICAL EXAMINATION:

VITAL SIGNS:  Temperature 98.1, pulse 86, respiratory rate 18, blood

pressure 123/51, and pulse oximetry 98.

HEENT:  Head is normocephalic and atraumatic.  Eyes; PERRLA.  Extraocular

muscles are intact.  Conjunctivae clear.  Nose patent.

NECK:   Supple.  No carotid bruit.  No JVD or thyromegaly.

CHEST:  Bilaterally symmetrical.

HEART:  S1 and S2 positive.

LUNGS:  Clear to auscultation.

ABDOMEN:  Soft.  Bowel sounds present.  No organomegaly.

EXTREMITIES:  No edema.  No cyanosis.

NEUROLOGIC:  Awake and alert, follows simple commands.



LABORATORY DATA:  White blood cells 6.3, hemoglobin 10.4, hematocrit 32.3,

platelets 158.  Sodium 132, potassium 3.8, BUN 21, creatinine 1.1, glucose

129 and magnesium 1.6.



ASSESSMENT AND PLAN:  Ms. Ilia Amezquita is a 73-year-old lady with

hyperglycemia last night with hypomagnesemia, anemia, influenza positive

for type A.  Lower extremity ultrasound is pending.  Has history of atrial

fibrillation, coronary artery disease, congestive heart failure, history of

fibromyalgia, valvular heart disease, valve replacement, 

particularly she was seen by the physician and was continued on inhaled

bronchodilator with some shortness of breath, has influenza, multiple

bronchitis, congestive heart failure, history of fibromyalgia, history of

valvular heart disease.   started on Tamiflu. 

Appreciated Dr. Glass's and Dr. Flannery's input.  Repeat labs.  We will

followup.







__________________________________________

Veronique Dangelo MD





DD:  02/09/2019 0:06:28

DT:  02/09/2019 2:07:28

Job # [id number]

[id number]

## 2019-02-09 NOTE — CP.PCM.CON
History of Present Illness





- History of Present Illness


History of Present Illness: 








Awake, ambulating to bathroom, no distress,





Reason for consultation: Cardiac evaluation of chest pain and shortness of 

breath





Brief history of present illness: A 73 year old female who came in to the ER due

to headache, coughing, chest discomfort when coughing,  body malaise, shortness 

of breath. History of coronary artery disease ,atrial fibrillation, CHF, 

fibromyalgia, tinnitus, anxiety, mitral and tricuspid valve replacement in [facility] 2017.





Seen and examined by me and Dr. Flannery














Review of Systems





- Review of Systems


All systems: reviewed and no additional remarkable complaints except


Review of Systems: 





as per HPI





Past Patient History





- Past Social History


Smoking Status: Never Smoked





- CARDIAC


Hx Congestive Heart Failure: Yes





- PULMONARY


Hx Respiratory Disorders: No





- NEUROLOGICAL


Hx Neurological Disorder: No





- HEENT


Hx HEENT Problems: No





- RENAL


Hx Chronic Kidney Disease: No





- ENDOCRINE/METABOLIC


Hx Endocrine Disorders: No





- INTEGUMENTARY


Hx Dermatological Problems: No





- MUSCULOSKELETAL/RHEUMATOLOGICAL


Hx Falls: No





- GASTROINTESTINAL


Hx Gastrointestinal Disorders: Yes


Hx Gastroesophageal Reflux: Yes





- GENITOURINARY/GYNECOLOGICAL


Hx Genitourinary Disorders: No





- PSYCHIATRIC


Hx Anxiety: Yes


Hx Substance Use: No





- SURGICAL HISTORY


Hx Hysterectomy: Yes





- ANESTHESIA


Hx Anesthesia: Yes


Hx Anesthesia Reactions: No


Hx Malignant Hyperthermia: No





Meds


Allergies/Adverse Reactions: 


                                    Allergies











Allergy/AdvReac Type Severity Reaction Status Date / Time


 


No Known Allergies Allergy   Verified 02/08/19 10:00














- Medications


Medications: 


                               Current Medications





Acetaminophen (Tylenol 325mg Tab)  650 mg PO Q6H PRN


   PRN Reason: Fever >100.4 F


   Last Admin: 02/08/19 18:27 Dose:  650 mg


Arformoterol Tartrate (Brovana)  15 mcg IH [id number] [facility]


   Last Admin: 02/09/19 07:52 Dose:  15 mcg


Aspirin (Aspirin Chewable)  81 mg PO DAILY [facility]


Atorvastatin Calcium (Lipitor)  20 mg PO DAILY [facility]


Benzonatate (Tessalon Perles)  200 mg PO Q8H [facility]


Budesonide (Pulmicort Respules)  0.25 mg IH [id number] [facility]


   Last Admin: 02/09/19 07:52 Dose:  0.25 mg


Enoxaparin Sodium (Lovenox)  40 mg SC DAILY [facility]; Protocol


   Last Admin: 02/08/19 14:56 Dose:  40 mg


Furosemide (Lasix)  20 mg PO DAILY [facility]


Gabapentin (Neurontin)  800 mg PO TID [facility]


   Last Admin: 02/08/19 17:58 Dose:  800 mg


Metoprolol Tartrate (Lopressor)  50 mg PO BID [facility]


   Last Admin: 02/08/19 18:01 Dose:  50 mg


Naproxen (Anaprox Ds)  550 mg PO BID [facility]


   Last Admin: 02/08/19 17:59 Dose:  550 mg


Ondansetron HCl (Zofran Inj)  4 mg IVP Q6H PRN


   PRN Reason: Nausea/Vomiting


   Last Admin: 02/08/19 18:27 Dose:  4 mg


Oseltamivir Phosphate (Tamiflu Cap)  75 mg PO BID [facility]; Protocol


   Stop: 02/13/19 14:20


   Last Admin: 02/08/19 17:59 Dose:  75 mg











Physical Exam





- Constitutional


Appears: Non-toxic, No Acute Distress





- Head Exam


Head Exam: NORMAL INSPECTION, NORMOCEPHALIC





- Eye Exam


Eye Exam: Normal appearance


Pupil Exam: NORMAL ACCOMODATION





- ENT Exam


ENT Exam: Mucous Membranes Moist, Normal Exam





- Respiratory Exam


Respiratory Exam: Decreased Breath Sounds, NORMAL BREATHING PATTERN





- Cardiovascular Exam


Cardiovascular Exam: Bradycardia, +S1, +S2


Additional comments: 





Telemetry SB 50's





- GI/Abdominal Exam


GI & Abdominal Exam: Normal Bowel Sounds, Soft





- Extremities Exam


Extremities exam: Positive for: full ROM, normal capillary refill





- Neurological Exam


Neurological exam: Alert, Oriented x3





- Psychiatric Exam


Psychiatric exam: Normal Affect, Normal Mood





- Skin


Skin Exam: Dry, Normal Color, Warm





Results





- Vital Signs


Recent Vital Signs: 


                                Last Vital Signs











Temp  98.2 F   02/09/19 06:00


 


Pulse  54 L  02/09/19 06:00


 


Resp  18   02/09/19 06:00


 


BP  125/69   02/09/19 06:00


 


Pulse Ox  98   02/09/19 06:00














- Labs


Result Diagrams: 


                                 02/09/19 07:36





                                 02/09/19 07:36


Labs: 


                         Laboratory Results - last 24 hr











  02/08/19 02/08/19 02/08/19





  10:07 10:18 10:18


 


WBC   6.3 


 


RBC   3.87 


 


Hgb   10.4 L 


 


Hct   32.3 L 


 


MCV   83.5 


 


MCH   26.9 


 


MCHC   32.2 


 


RDW   14.5 


 


Plt Count   168 


 


MPV   9.6 


 


Neut % (Auto)   82.1 H 


 


Lymph % (Auto)   9.0 L 


 


Mono % (Auto)   8.4 H 


 


Eos % (Auto)   0.2 L 


 


Baso % (Auto)   0.3 


 


Lymph # (Auto)   0.6 L 


 


Mono # (Auto)   0.5 


 


Eos # (Auto)   0.0 


 


Baso # (Auto)   0.02 


 


Absolute Neuts (auto)   5.18 


 


PT   


 


INR   


 


APTT   


 


D-Dimer, Quantitative   


 


Sodium    132


 


Potassium    3.8


 


Chloride    98


 


Carbon Dioxide    23


 


Anion Gap    14


 


BUN    21


 


Creatinine    1.1


 


Est GFR ( Amer)    59


 


Est GFR (Non-Af Amer)    49


 


Random Glucose    129 H


 


Calcium    9.1


 


Magnesium    1.6 L


 


Total Bilirubin    0.5


 


AST    30


 


ALT    12


 


Alkaline Phosphatase    66


 


Lactate Dehydrogenase    500


 


Total Creatine Kinase    252 H


 


CK-MB (CK-2)    0.7


 


CK-MB (CK-2) %    Cancelled


 


Troponin I    0.03  D


 


NT-Pro-B Natriuret Pep    5330 H


 


Total Protein    7.5


 


Albumin    4.3


 


Globulin    3.2


 


Albumin/Globulin Ratio    1.4


 


Urine Color   


 


Urine Appearance   


 


Urine pH   


 


Ur Specific Gravity   


 


Urine Protein   


 


Urine Glucose (UA)   


 


Urine Ketones   


 


Urine Blood   


 


Urine Nitrate   


 


Urine Bilirubin   


 


Urine Urobilinogen   


 


Ur Leukocyte Esterase   


 


Urine RBC   


 


Urine WBC   


 


Ur Epithelial Cells   


 


Urine Bacteria   


 


Influenza Typ A,B (EIA)  Pos for influenza a H  


 


Grp A Beta Strep Ag  Negative  














  02/08/19 02/09/19





  10:18 02:30


 


WBC  


 


RBC  


 


Hgb  


 


Hct  


 


MCV  


 


MCH  


 


MCHC  


 


RDW  


 


Plt Count  


 


MPV  


 


Neut % (Auto)  


 


Lymph % (Auto)  


 


Mono % (Auto)  


 


Eos % (Auto)  


 


Baso % (Auto)  


 


Lymph # (Auto)  


 


Mono # (Auto)  


 


Eos # (Auto)  


 


Baso # (Auto)  


 


Absolute Neuts (auto)  


 


PT  13.6 H 


 


INR  1.20 


 


APTT  28.6 


 


D-Dimer, Quantitative  287 H 


 


Sodium  


 


Potassium  


 


Chloride  


 


Carbon Dioxide  


 


Anion Gap  


 


BUN  


 


Creatinine  


 


Est GFR ( Amer)  


 


Est GFR (Non-Af Amer)  


 


Random Glucose  


 


Calcium  


 


Magnesium  


 


Total Bilirubin  


 


AST  


 


ALT  


 


Alkaline Phosphatase  


 


Lactate Dehydrogenase  


 


Total Creatine Kinase  


 


CK-MB (CK-2)  


 


CK-MB (CK-2) %  


 


Troponin I  


 


NT-Pro-B Natriuret Pep  


 


Total Protein  


 


Albumin  


 


Globulin  


 


Albumin/Globulin Ratio  


 


Urine Color   Yellow


 


Urine Appearance   Clear


 


Urine pH   5.5


 


Ur Specific Gravity   1.025


 


Urine Protein   Negative


 


Urine Glucose (UA)   Negative


 


Urine Ketones   Negative


 


Urine Blood   Negative


 


Urine Nitrate   Negative


 


Urine Bilirubin   Negative


 


Urine Urobilinogen   0.2


 


Ur Leukocyte Esterase   Small H


 


Urine RBC   2 - 5 H


 


Urine WBC   5 - 10 H


 


Ur Epithelial Cells   3 - 4


 


Urine Bacteria   Small


 


Influenza Typ A,B (EIA)  


 


Grp A Beta Strep Ag  














Assessment & Plan





- Assessment and Plan (Free Text)


Assessment: 








A 73 year old female who came in to the ER due to headache, coughing, chest 

discomfort when coughing,  body malaise, shortness of breath. History of 

coronary artery disease ,atrial fibrillation, CHF, fibromyalgia, tinnitus, 

anxiety, mitral and tricuspid valve replacement in [facility] 2017. She

was admitted at [facility] 3/2017 for congestive heart failure. Echo was done at that 

time with severe mitral and tricuspid regurgitation. Cardiac cath was not done 

as she was already scheduled at [facility] and open heart surgery at 

[facility]. She was being followed up by Dr. Brito  at [facility].This 

admission,  Chest X ray showed normal/unremarkable results, EKG showed NSR. 

Troponin normal x 2. Rule out acute coronary syndrome Will order echo to 

evaluate valve function. Positive for influenza. On Tamiflu.








Previous cardiac work up at [facility]:


3/28/17- Echo done prior to mitral and tricuspid Valve replacements:


            LVEF 55-60%, Trace aortic regurgitation, severe MR, Severe TR


            Moderate pulmonary hypertension





Plan: 





Echo to evaluate LV function


Denies chest pain and shortness of breath, Feels much better


Continue Tamiflu for influenza A


PRN Tessalon perles for coughing


Blood pressure and heart rate controlled


On ASA 81 mg daily, Lipitor 20 mg daily,


Lovenox 40 mg daily,Lasix 20 mg daily, Lopressor 50 mg BID


Tamiflu 75 mg BID


Continue current medications


Continue current treatment


Will follow up


Further recommendations during hospital course





Plan and treatment discussed with Dr. Flannery





Thank you Dr. Dangelo for the opportunity of taking care of  Ms. Ilia Gongora























- Date & Time


Date: 02/09/19


Time: 06:35

## 2019-02-09 NOTE — PN
DATE:  02/09/2019



SUBJECTIVE:  Patient is 73-year-old female.  The patient was seen and

examined at bedside on 02/09/2019, looking comfortable, coughing, but

shortness of breath is better, feeling better.  No hematuria.  No

hematochezia.  No headache.  No dizziness.  No chest pain.  No

palpitations.  The patient is on drplet  precautions for influenza A.



PHYSICAL EXAMINATION:

VITAL SIGNS:  Blood pressure 125/69, pulse 54, temperature 98.2, oxygen

saturation 98% on room air.

HEENT:  Head is normocephalic, atraumatic.  Eyes  PERRLA.  Extraocular

muscles intact.  Conjunctivae clear.  Nose patent.  Mucous membrane moist.

NECK:  Supple.  No carotid bruit.  No thyromegaly.

CHEST:  Bilaterally symmetrical.

HEART:  S1 and S2 positive.

LUNGS:  Scattered rhonchi bilaterally.

ABDOMEN:  Soft.  Bowel sounds present.  No organomegaly.

EXTREMITIES:  No edema.  No cyanosis.

NEUROLOGIC::  Patient is awake and alert, follows simple commands.



MEDICATIONS:  Tylenol, Brovana, aspirin, Lipitor, Pulmicort, Lovenox,

Lasix, Neurontin, Lopressor, naproxen, Zofran, and Tamiflu.



LABORATORY DATA:  We do not have recent labs today, but I reviewed old

labs.



ASSESSMENT AND PLAN:  Ms. Ilia Gongora is a 73-year-old female came with

influenza A infection, bronchitis, history of fibromyalgia, history of

valvular heart disease, atrial fibrillation, the patient is on drplet 

precaution.  D-dimer is positive.  Did V/Q scan.  Doppler of lower

extremity done is negative.  Echocardiography ordered.  Dr. Glass ordered

Tessalon Perles.  Need sleep study.  We will work on that.  Patient had

cardiomyopathy, atrial fibrillation, history of valvular heart disease,

surgery.  Gastrointestinal and deep venous thrombosis prophylaxis.  Repeat

labs.  We will followup.







__________________________________________

Veronique Dangelo MD





DD:  02/09/2019 18:08:19

DT:  02/09/2019 19:02:59

Job # [id number]

MTDOCTAVIO

## 2019-02-09 NOTE — PN
DATE:  02/09/2019



PULMONARY PROGRESS NOTE



REFERRING PHYSICIAN:  Veronique Dangelo MD



SUBJECTIVE:  The patient is sitting at bedside.  No acute distress.  The

patient reports waking up this morning, feeling well, but has episodes of

coughing productive cough this morning and states that she feels better

than yesterday.  The patient continues on Droplet precautions for Influenza

A.  No headache, rhinitis, chest pain, abdominal pain, nausea, vomiting,

diarrhea, leg pain, or leg swelling reported.



OBJECTIVE:

GENERAL:  No acute distress.

VITAL SIGNS:  Blood pressure 125/69, pulse 54, temperature 98.2 and oxygen

saturation 98% on room air.

HEENT:  Moist mucous membranes.  Crowded airway.

NECK:  Supple.  No JVD.

LUNGS:  Scattered rhonchi bilaterally.

CARDIOVASCULAR:  S1 and S2 audible.

ABDOMEN:  Soft and nontender.  No distention.  No organomegaly.

EXTREMITIES:  No bilateral lower extremity edema.

NEUROLOGIC:  Awake, alert, and verbal.  Follows commands.



MEDICATIONS:  Reviewed.  Tylenol 650 mg every 6 hours p.r.n. fever greater

than 100.4, Brovana 15 mcg every 21 hours inhalation, aspirin 81 mg daily,

Lipitor 20 mg daily, Pulmicort 0.25 mg inhalation every 12 hours, Lovenox

40 mg subcutaneously daily, Lasix 20 mg daily, Neurontin 800 mg 3 times a

day, Lopressor twice a day, naproxen 550 mg twice a day, Zofran 4 mg

every 6 hours p.r.n., Tamiflu 75 mg twice a day.



LABORATORY DATA:  Reviewed.  Urinalysis shows urine leukocyte esterase

small urine RBCs 2 to 5, urine WBC 5 to 10.  Extremity ultrasound report

pending.



IMPRESSION AND PLAN:  Influenza A infection, bronchitis, history of

fibromyalgia, history of valvular heart disease, atrial fibrillation

history, continue Droplet precaution, inhaled bronchodilator, deep venous

thrombosis prophylaxis, gastric prophylaxis, venous Doppler of lower

extremity report pending report. Will followup.  Echocardiogram ordered and

pending to be done to assess right ventricular and left ventricular

function.  We will order Tessalon Perles 200 mg every 8 hours for cough. 

The patient will need sleep study to rule out sleep apnea as a cause of

cardiomyopathy and atrial fibrillation as outpatient.



This patient is seen and examined with Dr. Glass.  Discussed assessment

and plan as described above.



This patient is seen and examined with Sarath Ross, nurse practitioner.

Discussed assessment and plan as described above.



Thank you for this consult.  We will follow with you.







__________________________________________

SUSHANT Fernandez





__________________________________________

Sonido Glass MD





DD:  02/09/2019 8:23:38

DT:  02/09/2019 9:00:24

Job # [id number]

MTDOCTAVIO

## 2019-02-10 LAB
BUN SERPL-MCNC: 36 MG/DL (ref 7–21)
CALCIUM SERPL-MCNC: 8.8 MG/DL (ref 8.4–10.5)
ERYTHROCYTE [DISTWIDTH] IN BLOOD BY AUTOMATED COUNT: 14.8 % (ref 11.5–14.5)
GFR NON-AFRICAN AMERICAN: 34
HDLC SERPL-MCNC: 52 MG/DL (ref 29–60)
HGB BLD-MCNC: 11.9 G/DL (ref 12–16)
LDLC SERPL-MCNC: 72 MG/DL (ref 0–129)
MCH RBC QN AUTO: 26.7 PG (ref 25–35)
MCHC RBC AUTO-ENTMCNC: 31.4 G/DL (ref 31–37)
MCV RBC AUTO: 85.2 FL (ref 80–105)
PLATELET # BLD: 168 10^3/UL (ref 120–450)
PMV BLD AUTO: 9.7 FL (ref 7–11)
RBC # BLD AUTO: 4.45 10^6/UL (ref 3.5–6.1)
WBC # BLD AUTO: 3.7 10^3/UL (ref 4.5–11)

## 2019-02-10 RX ADMIN — NAPROXEN SODIUM SCH MG: 550 TABLET ORAL at 09:13

## 2019-02-10 RX ADMIN — ARFORMOTEROL TARTRATE SCH MCG: 15 SOLUTION RESPIRATORY (INHALATION) at 07:44

## 2019-02-10 RX ADMIN — NAPROXEN SODIUM SCH MG: 550 TABLET ORAL at 17:32

## 2019-02-10 RX ADMIN — ARFORMOTEROL TARTRATE SCH MCG: 15 SOLUTION RESPIRATORY (INHALATION) at 20:22

## 2019-02-10 RX ADMIN — ENOXAPARIN SODIUM SCH MG: 40 INJECTION SUBCUTANEOUS at 09:12

## 2019-02-10 RX ADMIN — BUDESONIDE SCH MG: 0.25 SUSPENSION RESPIRATORY (INHALATION) at 20:22

## 2019-02-10 RX ADMIN — FUROSEMIDE SCH MG: 40 SOLUTION ORAL at 09:13

## 2019-02-10 RX ADMIN — BUDESONIDE SCH MG: 0.25 SUSPENSION RESPIRATORY (INHALATION) at 07:45

## 2019-02-10 NOTE — NM
Date of service: 



02/09/2019



COMPARISON:

02/08/2019 chest x-ray



TECHNIQUE:

33.0 mCi technetium 99-m  DTPA aerosol. 



4.0 mCI technetium 99-m MAA administered intravenously.



FINDINGS:



VENTILATION COMPONENT:

Normal.



PERFUSION COMPONENT:

Normal.



IMPRESSION:

Lowprobability ventilation perfusion scan for pulmonary embolism.
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
## 2019-02-10 NOTE — PN
DATE:  02/10/2019



PULMONARY PROGRESS NOTE



REFERRING PHYSICIAN:  Veronique Dangelo MD



SUBJECTIVE:  The patient is lying in bed, reports still having cough, but

feels a little bit better this morning, continues on Droplet precautions

for influenza A.  No headache, rhinitis, chest pain, abdominal pain,

nausea, vomiting, diarrhea, leg pain, or leg swelling reported.



OBJECTIVE:

GENERAL:  No acute distress.

VITAL SIGNS:  Blood pressure 109/77, pulse 60, temperature 98.4, and oxygen

saturation 98% on room air.

HEENT:  Moist mucous membranes, crowded airway.

NECK:  Supple, no JVD.

LUNGS:  Scattered rhonchi bilaterally.

CARDIOVASCULAR:  S1 and S2.

ABDOMEN:  Soft, no distention, nontender, no organomegaly.

EXTREMITIES:  No bilateral lower extremity edema.

NEUROLOGIC:  Awake, alert, verbal, follows commands.



LABORATORY DATA:  Reviewed.  WBC 3.5, RBC 4.45, hemoglobin 11.9, hematocrit

37.9, platelets 168.  Sodium 138, potassium 5.2, chloride 103, carbon

dioxide 30, anion gap 11, BUN 36, creatinine 1.5, GFR 34, random glucose

94, calcium 8.8, and TSH 3.48.  Blood cultures preliminary no growth after

48 hours.



Echocardiogram showed ejection fraction of 65-70%, trivial AR, aortic

stenosis versus mild AS, trace tricuspid regurgitation, RVSP 16.  No

pericardial effusion, no vegetation or thrombus.  V/Q scan showed low

probability of ventilation perfusion scan for pulmonary embolism.



MEDICATIONS:  Reviewed, Tylenol 650 mg every 6 hours p.r.n. fever greater

than 100.4, Brovana 15 mcg every 12 hour, aspirin 81 mg p.o. daily, Lipitor

20 mg daily, Tessalon Perles 200 mg every 8 hours, Pulmicort 0.25 mg

inhalation every 12 hours, Lovenox 40 mg subcu daily, Lasix 20 mg daily,

Neurontin 800 mg 3 times a day, metoprolol tartrate 15 mg twice a day,

naproxen 550 twice a day, Zofran 4 mg IV push every 6 hours p.r.n., and

Tamiflu 75 mg twice a day.



IMPRESSION AND PLAN:  Influenza A infection, bronchitis, history of

fibromyalgia, history of valvular heart disease, and atrial fibrillation,

currently on Droplet precaution for influenza A infection.  Continue

inhaled bronchodilators, deep venous thrombosis prophylaxis, and gastric

prophylaxis.  Continue cough suppressant.  The patient will need sleep

study to rule out sleep apnea, cardiomyopathy, and atrial fibrillation as

outpatient.



This patient was seen and examined with Dr. Glass.  Discussed assessment

and plan as described above.



This patient is seen and examined with Sarath Ross, nurse practitioner.

Discussed assessment and plan as described above.



Thank you for this consult.  We will follow with you.







__________________________________________

SUSHANT Fernandez





__________________________________________

Sonido Glass MD





DD:  02/10/2019 15:09:40

DT:  02/10/2019 15:42:45

Job # [id number]

## 2019-02-11 RX ADMIN — BUDESONIDE SCH MG: 0.25 SUSPENSION RESPIRATORY (INHALATION) at 07:30

## 2019-02-11 RX ADMIN — METHYLPREDNISOLONE SODIUM SUCCINATE SCH MG: 40 INJECTION, POWDER, FOR SOLUTION INTRAMUSCULAR; INTRAVENOUS at 21:45

## 2019-02-11 RX ADMIN — ARFORMOTEROL TARTRATE SCH MCG: 15 SOLUTION RESPIRATORY (INHALATION) at 20:05

## 2019-02-11 RX ADMIN — ARFORMOTEROL TARTRATE SCH MCG: 15 SOLUTION RESPIRATORY (INHALATION) at 07:30

## 2019-02-11 RX ADMIN — ENOXAPARIN SODIUM SCH MG: 40 INJECTION SUBCUTANEOUS at 11:15

## 2019-02-11 RX ADMIN — FUROSEMIDE SCH MG: 40 SOLUTION ORAL at 11:13

## 2019-02-11 RX ADMIN — METHYLPREDNISOLONE SODIUM SUCCINATE SCH MG: 40 INJECTION, POWDER, FOR SOLUTION INTRAMUSCULAR; INTRAVENOUS at 11:14

## 2019-02-11 RX ADMIN — BUDESONIDE SCH MG: 0.25 SUSPENSION RESPIRATORY (INHALATION) at 20:05
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
## 2019-02-11 NOTE — US
HISTORY:

Leg pain and swelling. Evaluate for DVT



PHYSICIAN(S):  Lonnie Enamorado MD.



TECHNIQUE:

Duplex sonography and color-flow Doppler with graded compression were 

used to evaluate the deep venous systems of both lower extremities. 



FINDINGS:

There is isolated right tibial DVT in the peroneal veins, age 

indeterminate.  The right popliteal vein, right femoral vein, right 

common femoral vein and proximal right profunda femoral vein are 

patent and compressible. 



There is no sonographic evidence for deep venous thrombosis the 

visualized segments of left lower extremity 



IMPRESSION:

Isolated right tibial DVT.

## 2019-02-11 NOTE — PN
DATE:  02/10/2019



SUBJECTIVE:  The patient is a 73-year-old female.  The patient was seen and

examined at the bedside, 02/10/2019, looking comfortable, coughing,

wheezing, shortness of breath is better, fever is better, and body aches

are better.  Still on Droplet precautions.



PHYSICAL EXAMINATION:

VITAL SIGNS:  Blood pressure 110/70, pulse 50, temperature 98.4, and oxygen

saturation 98% on room air.

HEENT:  Head is normocephalic and atraumatic.  Eyes; PERRLA.  Extraocular

muscles intact.  Conjunctivae clear.  Nose patent.  Mucous membranes moist.

NECK:  Supple.  No carotid bruit.  No JVD or thyromegaly.

CHEST:  Bilaterally symmetrical.

HEART:  S1 and S2 positive.

LUNGS:  Positive wheezing bilaterally.

HEART:  S1 and S2 positive.

ABDOMEN:  Soft.  Bowel sounds present.  No organomegaly.

EXTREMITIES:  No edema.  No cyanosis.

NEUROLOGIC:  The patient is awake and alert.  Moving all four extremities. 

No focal deficit.



LABORATORY DATA:  White blood cells 3.5, hemoglobin 11.9, hematocrit 37.9,

and platelets 158.  Sodium 138, potassium 4.2, BUN 36, creatinine 1.5, GFR

34, calcium 8.8, and TSH 3.48.



ASSESSMENT AND PLAN:  Mrs. Bia Gongora is a 73-year-old lady

came with body aches, shortness of breath, have Influenza A infection,

bronchitis, history of fibromyalgia, valvular heart disease, recently got

surgery in  [facility], atrial fibrillation, on Droplet 
precaution

for influenza A infection.  Continue inhaled bronchodilators, deep venous

thrombosis prophylaxis, and gastrointestinal prophylaxis.  Getting Tamiflu.

Dr. Glass ordered Tessalon Perles, Pulmicort, continue Lovenox, Lasix,

Neurontin, metoprolol, Naprosyn started for body aches and she was feeling

better.  Repeat labs.  We will followup.







__________________________________________

Veronique Dangelo MD





DD:  02/11/2019 0:22:44

DT:  02/11/2019 1:05:52

Job # [id number]

[facility]

## 2019-02-12 LAB
BNP SERPL-MCNC: 775 PG/ML (ref 0–450)
BUN SERPL-MCNC: 37 MG/DL (ref 7–21)
CALCIUM SERPL-MCNC: 8.9 MG/DL (ref 8.4–10.5)
GFR NON-AFRICAN AMERICAN: 49

## 2019-02-12 RX ADMIN — ARFORMOTEROL TARTRATE SCH MCG: 15 SOLUTION RESPIRATORY (INHALATION) at 21:40

## 2019-02-12 RX ADMIN — FUROSEMIDE SCH MG: 40 SOLUTION ORAL at 09:44

## 2019-02-12 RX ADMIN — BUDESONIDE SCH MG: 0.25 SUSPENSION RESPIRATORY (INHALATION) at 07:23

## 2019-02-12 RX ADMIN — BUDESONIDE SCH MG: 0.25 SUSPENSION RESPIRATORY (INHALATION) at 21:40

## 2019-02-12 RX ADMIN — ARFORMOTEROL TARTRATE SCH MCG: 15 SOLUTION RESPIRATORY (INHALATION) at 07:22

## 2019-02-12 RX ADMIN — ENOXAPARIN SODIUM SCH MG: 40 INJECTION SUBCUTANEOUS at 09:43

## 2019-02-12 RX ADMIN — METHYLPREDNISOLONE SODIUM SUCCINATE SCH MG: 40 INJECTION, POWDER, FOR SOLUTION INTRAMUSCULAR; INTRAVENOUS at 21:15

## 2019-02-12 RX ADMIN — METHYLPREDNISOLONE SODIUM SUCCINATE SCH MG: 40 INJECTION, POWDER, FOR SOLUTION INTRAMUSCULAR; INTRAVENOUS at 09:43

## 2019-02-12 NOTE — PN
DATE:  02/12/2019



PULMONARY PROGRESS NOTE.



REFERRING PHYSICIAN:  Veronique Dangelo MD



SUBJECTIVE:  The patient is sitting up at bedside, reports feeling much

better this morning, still has cough but has some improvement.  No

headaches, rhinitis, chest pain, abdominal pain, nausea, vomiting, leg pain

or leg swelling reported.



OBJECTIVE:

VITAL SIGNS:  Blood pressure 134/79, pulse 52, temperature 98.2, and oxygen

saturation 99% on room air.

GENERAL:  No acute distress.

HEENT:  Moist mucous membranes, crowded airway.

NECK:  Supple, no JVD.

LUNGS:  Rhonchi bilaterally.

CARDIOVASCULAR:  S1 and S2 audible.

ABDOMEN:  Soft and nontender.  No distention, no organomegaly.

EXTREMITIES:  No bilateral lower extremity edema.

NEUROLOGIC:  Awake, alert, and verbal.  Follows commands.



MEDICATIONS:  Reviewed.  Tylenol 650 mg every 6 hours p.r.n. fever greater

than 100.4, Brovana 15 mcg every 12 hours, aspirin 81 mg daily, Lipitor 20

mg daily, Tessalon Perles 200 mg every 8 hours, Pulmicort 0.25 mg

inhalation every 12 hours, Lovenox 40 mg subcu daily, Pepcid 40 mg at

bedtime, Lasix 20 mg daily, Neurontin 800 mg 3 times a day, Solu-Medrol 20

mg every 12 hours, metoprolol tartrate 50 mg twice a day, Zofran 4 mg every

6 hours p.r.n., and Tamiflu 30 mg twice a day.



LABORATORY DATA:  Reviewed.  WBC 3.7, RBC 4.45, hemoglobin 11.9, hematocrit

37.9, platelets 168.  Sodium 138, potassium 4.9, chloride 103, carbon

dioxide 27, anion gap 13, BUN 37, creatinine 1.1, GFR 49, random glucose

112, calcium 8.9.  ProBNP is 775.  Blood cultures preliminary no growth

after 3 days.



Extremity ultrasound shows isolated right tibial DVT.



IMPRESSION AND PLAN:  Influenza A infection, bronchitis triggered by

environmental factors, history of fibromyalgia, valvular heart disease,

atrial fibrillation and deep vein thrombosis right tibial.  The patient is

on Droplet precautions for influenza A infection.  Continue inhaled

bronchodilators, gastric prophylaxis.  The patient will need to repeat

venous Doppler in 1 week to ensure stability of deep venous thrombosis to

right tibial.  Continue on Lovenox 40 mg at this time.  ProBNP trending

down.  Continue diuretics.  The patient will need full pulmonary function

tests as outpatient to evaluate chronic lung disease.  We will need sleep

study to rule out sleep apnea as the cause for cardiomyopathy and atrial

fibrillation as outpatient.



This patient was seen and examined with Dr. Glass.  Discussed assessment

and plan as described above.



This patient was seen and examined with Sarath Ross, nurse

practitioner.  Discussed assessment and plan as described above.



Thank you for this consult.  We will follow with you.







__________________________________________

SUSHANT Fernandez





__________________________________________

Sonido Glass MD





DD:  02/12/2019 9:29:19

DT:  02/12/2019 10:24:10

Job # [id number]

## 2019-02-12 NOTE — PN
DATE:  02/11/2019



SUBJECTIVE:  The patient was seen and examined at bedside on 02/11/2019. 

Daughter was sitting on the bedside, looking comfortable.  No fever.  No

chills.  Complaining about cough but better.  Shortness of breath is

better.  No headache.  No dizziness.  No chest pain.  No swelling of the

legs.



PHYSICAL EXAMINATION:

VITAL SIGNS:  Blood pressure 120/60, pulse 57, temperature 97.5, oxygen

saturation is 100%.

HEENT:  Head:  Normocephalic, atraumatic.  Eyes:  PERRLA.  Extraocular

muscles intact.  Conjunctivae clear.  Nose patent.  Mucous membranes moist.

NECK:  Supple.  No carotid bruit.  No thyromegaly.

CHEST:  Bilaterally symmetrical.

HEART:  S1 and S2 positive.

LUNGS:  Clear to auscultation.

ABDOMEN:  Soft.  Bowel sounds present.  No organomegaly.

EXTREMITIES:  No edema.  No cyanosis.

NEUROLOGIC:  The patient is awake, alert.  Moving all four extremities.  No

focal deficits.



MEDICATIONS:  Tylenol, Brovana, aspirin, Lipitor, Tessalon Perles,

Pulmicort, Lovenox, Neurontin, Solu-Medrol, metoprolol, Zofran, Tamiflu.



LABORATORY DATA:  White blood cell is 3.7, hemoglobin 11.9, hematocrit

37.9, and platelets 158.



ASSESSMENT AND PLAN:  Ms. Ilia Gongora is a 73-year-old female, has

influenza infection, bronchitis triggered by environmental factors, history

of fibromyalgia, valvular heart disease, atrial fibrillation, peripheral

neuropathy.  The patient is still in blood precautions.  For influenza A,

getting Tamiflu.  The patient was wheezing, now getting better with

Solu-Medrol and breathing treatment. 

Pulmonologist is on the case.  Continue BiPAP, inhaled bronchodilators. 

Repeat labs.  We will follow up.  Discussion done with the patient's

family.  All questions answered.







__________________________________________

Veronique Dangelo MD





DD:  02/12/2019 9:23:23

DT:  02/12/2019 9:48:04Job # [id number]

MTDD

## 2019-02-13 RX ADMIN — BUDESONIDE SCH MG: 0.25 SUSPENSION RESPIRATORY (INHALATION) at 07:41

## 2019-02-13 RX ADMIN — METHYLPREDNISOLONE SODIUM SUCCINATE SCH MG: 40 INJECTION, POWDER, FOR SOLUTION INTRAMUSCULAR; INTRAVENOUS at 10:01

## 2019-02-13 RX ADMIN — ARFORMOTEROL TARTRATE SCH MCG: 15 SOLUTION RESPIRATORY (INHALATION) at 07:40

## 2019-02-13 RX ADMIN — METHYLPREDNISOLONE SODIUM SUCCINATE SCH MG: 40 INJECTION, POWDER, FOR SOLUTION INTRAMUSCULAR; INTRAVENOUS at 21:16

## 2019-02-13 RX ADMIN — BUDESONIDE SCH MG: 0.25 SUSPENSION RESPIRATORY (INHALATION) at 19:34

## 2019-02-13 RX ADMIN — FUROSEMIDE SCH MG: 40 SOLUTION ORAL at 10:20

## 2019-02-13 RX ADMIN — ARFORMOTEROL TARTRATE SCH MCG: 15 SOLUTION RESPIRATORY (INHALATION) at 19:34

## 2019-02-13 NOTE — PN
DATE:  02/12/2019



SUBJECTIVE:  The patient is a 73-year-old female.  The patient was seen and

examined at the bedside on 02/12/2019.  Still coughing and wheezing but

they were better.  No fever.  No chills.  No hematuria.  No hematochezia. 

No rhinitis.  No headache.  No dizziness.



PHYSICAL EXAMINATION:

VITAL SIGNS:  Blood pressure 130/70, pulse 52, temperature 98.2, and

oxygenation saturation 97% on room air.

HEENT:  Head:  Normocephalic and atraumatic.  Eyes:  PERRLA.  Extraocular

muscles are intact.  Conjunctivae clear.  Nose patent.  Mucous membranes

moist.

NECK:  Supple.  No carotid bruit, JVD, or thyromegaly.

CHEST:  Bilaterally symmetrical.

HEART:  S1 and S2 positive.

LUNGS:  Rhonchi bilaterally.

ABDOMEN:  Soft.  Nontender.  Nondistended.

EXTREMITIES:  Bilaterally no edema.  No cyanosis.

NEUROLOGIC:  The patient is awake and alert.  Follows simple commands.



MEDICATIONS:  Tylenol, Brovana, aspirin, Lipitor, Tessalon Perles,

Pulmicort, Lovenox, Pepcid, Lasix, Neurontin, Solu-Medrol, metoprolol,

Zofran, Tamiflu.



LABORATORY DATA:  White blood cells 3.7, hemoglobin 11.9, hematocrit 37.9. 

Sodium 138, potassium 4.9, BUN 37, creatinine 1.1.  Blood culture

preliminary, no growth after 3 days.



ASSESSMENT AND PLAN:  Mrs. Bia Gongora is a 73-year-old lady,

came with influenza infection, bronchitis triggered by the environmental

factors, history of fibromyalgia, valvular heart disease, peripheral

neuropathy, getting Neurontin, atrial fibrillation, history of deep vein

thrombosis of the right lower extremity.  The patient is on Droplet

precautions for influenza A infection.  Continue inhaled bronchodilators. 

Gastrointestinal and deep vein thrombosis prophylaxis.  The patient will

need to repeat venous Doppler in one week to ensure stability of deep vein

thrombosis to the right tibial.  Continue Lovenox.  Need pulmonary function

test as outpatient,  asthma, chronic obstructive pulmonary disease,

sleep apnea syndrome, rule out cardiomyopathy, and atrial fibrillation. 

Discussed done with the patient and the patient's family and the patient's

nursing staff.  Reviewed Dr. Glass's notes.  Reviewed Dr. Contreras's addendum.

Repeat labs.  We will follow up.





__________________________________________

Veronique Dangelo MD





DD:  02/12/2019 19:25:19

DT:  02/12/2019 23:01:48

Job # [id number]

STEPHANIE

## 2019-02-13 NOTE — PN
DATE:  02/13/2019



REASON FOR CONSULTATION:  Followup, cardiac evaluation, history of MVR,

normal coronaries, admitted with the flu.



SUBJECTIVE:  The patient denied any chest pain, shortness of breath, any

palpitation on isolation.



PHYSICAL EXAMINATION:

VITAL SIGNS:  Temperature afebrile.  Heart rate 80, blood pressure 137/73.

HEENT:  PERRLA.  Extraocular muscles are intact.

NECK:  Supple.  No carotid bruit or thyromegaly.

CHEST:  Clear to auscultation.

HEART:  S1 and S2, regular.

ABDOMEN:  Soft.

EXTREMITIES:  Clubbing and cyanosis negative.



LABORATORY DATA:  Blood workup as follows:  WBC 3.7, hemoglobin 11.9,

hematocrit 37.9, and platelet count 168.  Chemistry showed sodium 130,

potassium 4.9, chloride 103, carbon dioxide 27, anion gap of 13, BUN 37,

and creatinine 1.1.



IMPRESSION:  A 73-year-old female admitted with the flu, history of _____

tricuspid regurgitation, status post mitral valve repair and tricuspid

repair at [facility] in 2017, admitted with flu symptoms.  Had a

cardiac catheterization before mitral valve repair, _____ normal coronary. 

So far, no evidence of acute myocardial infarction; admitted with flu, on

Tamiflu and isolation, improving.  History of hypertension, hyperlipidemia.



RECOMMENDATION:  Continue anti-flu medications as per ID and Pulmonary. 

Continue metoprolol.  Continue gentle Lasix.  We will follow as outpatient.

We will discontinue telemetry.  [facility] status is stable.  No evidence of acute

MI.







__________________________________________

Sonido Contreras MD





DD:  02/13/2019 9:29:13

DT:  02/13/2019 12:50:15

Job # [id number]

## 2019-02-13 NOTE — PN
DATE:  02/13/2019



PULMONARY PROGRESS NOTE



REFERRING PHYSICIAN:  Veronique Dangelo MD



SUBJECTIVE:  The patient is sitting up in bed.  No acute distress.  No

overnight events reported.  The patient reports feeling well today, still

has cough but it has gotten better.  Denies shortness of breath.  No

headache, rhinitis, chest pain, abdominal pain, nausea, vomiting, diarrhea,

leg pain or leg swelling reported.



OBJECTIVE:

GENERAL:  No acute distress.

VITAL SIGNS:  Blood pressure 137/73, pulse 60, temperature 98.6, and oxygen

saturation 95% on room air.

HEENT:  Moist mucous membranes.  Crowded airway.

NECK:  Supple.  No JVD.

LUNGS:  Rhonchi bilaterally.

CARDIOVASCULAR:  S1 and S2.

ABDOMEN:  Soft and nontender.  No distention.  No organomegaly.

EXTREMITIES:  No bilateral lower extremity edema.

NEUROLOGIC:  Awake, alert, and verbal.  Follows commands.



MEDICATIONS:  Reviewed.  Tylenol 650 mg every 6 hours p.r.n. fever greater

than 100.4, Eliquis 2.5 mg twice a day, Brovana 15 mcg every 12 hours,

aspirin 81 mg daily, Lipitor 20 mg daily, Tessalon Perles 200 mg every 8

hours, Pulmicort 0.25 mg inhalation every 12 hours, Pepcid 20 mg at

bedtime, Lasix 20 mg daily, Neurontin 800 mg 3 times a day, Solu-Medrol 20

mg every 12 hours, Lopressor 15 mg twice a day and Tamiflu 30 mg twice a

day.



LABORATORY DATA:  Reviewed.  Blood cultures final no growth after 5 days.



IMPRESSION AND PLAN:  Influenza A infection, bronchitis triggered by

environmental factors, history of fibromyalgia, valvular heart disease,

atrial fibrillation and deep vein thrombosis, right tibial.  The patient is

on Droplet precautions for influenza A infection.  Continue inhaled

bronchodilators, gastric prophylaxis.  The patient currently on

anticoagulation therapy, monitor for any bleeding, continue diuretics.  If

the patient is to be discharged, we recommend Medrol Dosepak, continue

Eliquis 2.5 mg twice a day.  The patient to followup with Dr. Dangelo in 2

to 3 days.  The patient will need sleep study to rule out sleep apnea as

cause of cardiomyopathy and atrial fibrillation as outpatient.  The patient

will also need full pulmonary function test as outpatient to evaluate

chronic lung disease.  She should also have a followup venous Doppler to

followup on deep venous thrombosis on right tibial.



This patient was seen and examined with Dr. Glass.  Discussed assessment

and plan as described above.



This patient was seen and examined with Sarath Ross, nurse

practitioner.  Discussed assessment and plan as described above.



Thank you for this consult and we will follow with you.







__________________________________________

SUSHANT Fernandez





__________________________________________

Sonido Glass MD)







DD:  02/13/2019 10:53:45

DT:  02/13/2019 11:14:46

Job # [id number]

## 2019-02-14 VITALS — DIASTOLIC BLOOD PRESSURE: 80 MMHG | SYSTOLIC BLOOD PRESSURE: 149 MMHG

## 2019-02-14 VITALS — HEART RATE: 69 BPM | TEMPERATURE: 98.3 F | RESPIRATION RATE: 18 BRPM

## 2019-02-14 VITALS — OXYGEN SATURATION: 96 %

## 2019-02-14 RX ADMIN — FUROSEMIDE SCH MG: 40 SOLUTION ORAL at 10:56

## 2019-02-14 RX ADMIN — METHYLPREDNISOLONE SODIUM SUCCINATE SCH: 40 INJECTION, POWDER, FOR SOLUTION INTRAMUSCULAR; INTRAVENOUS at 12:30

## 2019-02-14 RX ADMIN — ARFORMOTEROL TARTRATE SCH MCG: 15 SOLUTION RESPIRATORY (INHALATION) at 08:29

## 2019-02-14 RX ADMIN — BUDESONIDE SCH MG: 0.25 SUSPENSION RESPIRATORY (INHALATION) at 08:30

## 2019-02-14 NOTE — PN
DATE:  02/14/2019



PULMONARY PROGRESS NOTE



REFERRING PHYSICIAN:  Veronique Dangelo MD



SUBJECTIVE:  The patient is sitting up in bed, family at bedside.  No acute

distress.  No overnight events reported, still having cough.  No shortness

of breath.  No headache, rhinitis, chest pain, abdominal pain, nausea,

vomiting, diarrhea, leg pain or leg swelling reported.  The patient does

report that cough is better, however.



OBJECTIVE:

GENERAL:  No acute distress.

VITAL SIGNS:  Blood pressure 135/63, pulse 62, temperature 97.8, and oxygen

saturation 96% on room air.

HEENT:  Moist mucous membranes.  Crowded airway.

NECK:  Supple.  No JVD.

LUNGS:  Rhonchi bilaterally.

CARDIOVASCULAR:  S1 and S2.

ABDOMEN:  Soft and nontender.  No distention.  No organomegaly.

EXTREMITIES:  No bilateral lower extremity edema.

NEUROLOGIC:  Awake, alert, and verbal.  Follows commands.



MEDICATIONS:  Reviewed.  Tylenol 650 mg every 6 hours p.r.n. fever greater

than 100.4, Eliquis 2.5 mg twice a day, Brovana 15 mcg every 12 hours,

aspirin 81 mg daily, Lipitor 20 mg daily, Tessalon Perles 200 mg every 8

hours, Pulmicort 0.25 mg every 12 hours, Pepcid 20 mg at bedtime, Lasix 20

mg daily, Neurontin 800 mg 3 times a day, Solu-Medrol 20 mg every 12 hours

and Lopressor 50 mg twice a day.



LABORATORY DATA:  Reviewed.  No new labs since yesterday.



IMPRESSION AND PLAN:  Influenza A infection, bronchitis triggered by

environmental factors, history of fibromyalgia, valvular heart disease,

atrial fibrillation, deep venous thrombosis, right tibial deep venous

thrombosis.  The patient completed Tamiflu course, no longer on Droplet

precautions.  Continue gastric prophylaxis.  The patient currently on

anticoagulation therapy, monitor for any signs and symptoms of bleeding, if

any signs and symptoms of bleeding does occur, will need to stop

anticoagulation.  Will discontinue Solu-Medrol and start the patient on

prednisone 20 mg daily.  This patient will need sleep study to rule out

sleep apnea as cause of cardiomyopathy and atrial fibrillation as

outpatient.  The patient will also need full pulmonary function test as

outpatient to evaluate chronic lung disease.  She should also have a

followup venous Doppler to followup on deep venous thrombosis on right

tibial.  From pulmonary point of view, the patient is stable at this time

for discharge, just needs to ensure that she does followup as outpatient.



This patient was seen and examined with Dr. Glass.  Discussed assessment

and plan as described above.



This patient was seen and examined with Sarath Ross, nurse

practitioner.  Discussed assessment and plan as described above.



Thank you for this consult and we will follow with you.







__________________________________________

SUSHANT Fernandez





__________________________________________

Sonido Glass MD





DD:  02/14/2019 10:29:51

DT:  02/14/2019 11:17:41

Job # [id number]

## 2019-02-15 NOTE — PN
DATE:  02/13/2019



SUBJECTIVE:  The patient is 73-year-old.  The patient was seen and examined

at the bedside on 02/13/2019 and looking comfortable.  No fever.  No

chills.  No hematuria.  No hematochezia.  No headache.  No dizziness.  No

chest pain.  No palpitation.



PHYSICAL EXAMINATION:

VITAL SIGNS:  The patient is afebrile, heart rate 80, blood pressure

130/70, and respiratory rate 18.

HEENT:  Head; normocephalic and atraumatic.  Eyes; PERRLA.  Extraocular

muscles are intact.  Conjunctivae clear.  Nose patent.  Mucous membranes

moist.

NECK:  Supple.  No carotid bruit.  No JVD or thyromegaly.

CHEST:  Bilaterally symmetrical.

HEART:  S1 and S2 positive.

LUNGS:  Clear to auscultation.

ABDOMEN:  Soft.  Bowel sounds present.  No organomegaly.

EXTREMITIES:  No edema.  No cyanosis.

NEUROLOGIC:  The patient is awake and alert.  Follows simple commands.



LABORATORY DATA:  White blood cells 3.7, hemoglobin 11.9, hematocrit 37.9,

and platelets 158.  Sodium 130, potassium 4.9, BUN 37, creatinine 1.1 and

chloride 103.



MEDICATIONS:  Reviewed by me.



ASSESSMENT AND PLAN:  Ms. Bia Gongora is 73-year-old female,

with a history of tricuspid regurgitation, status post mitral valve

replacement, tricuspid repair at [facility] in 2017, admitted for flu

symptoms.  Had cardiac catheterization done before mitral valve repair, normal 
coronary arteries.  No evidence of acute myocardial

infarction.  Got Tamiflu, droplet isolation, improving.  History of

hypertension and hypercholesterolemia.  Continue Tamiflu.  Continue

metoprolol, Lasix.  Cardiologist just discontinued the telemetry.  

cardiac status is stable.  Cardiologist and Pulmonologist is on the case. 

The patient had bronchitis, triggered by environmental factors, history of

fibromyalgia, and atrial fibrillation with the deep vein thrombosis, right

tibial.  The patient is on anticoagulation therapy, Eliquis, getting

steroids, gastrointestinal and deep venous thrombosis prophylaxis.  Repeat

labs.  We will follow up.







__________________________________________

Veronique Dangelo MD





DD:  02/15/2019 10:35:22

DT:  02/15/2019 12:34:17

Job # [id number]

[id number]

## 2019-05-22 NOTE — CP.PCM.PN
Subjective





- Date & Time of Evaluation


Date of Evaluation: 02/10/19


Time of Evaluation: 06:25





- Subjective


Subjective: 











Awake, lying in bed, no distress,





Reason for consultation: Cardiac evaluation of chest pain and shortness of 

breath.History of coronary artery disease ,atrial fibrillation, CHF, 

fibromyalgia, tinnitus, anxiety, mitral and tricuspid valve repair in [facility] 2017.





Seen and examined by me and Dr. Flannery








Objective





- Vital Signs/Intake and Output


Vital Signs (last 24 hours): 


                                        











Temp Pulse Resp BP Pulse Ox


 


 98.4 F   60   20   109/77   98 


 


 02/10/19 06:00  02/10/19 06:00  02/10/19 06:00  02/10/19 06:00  02/10/19 06:00








Intake and Output: 


                                        











 02/10/19 02/10/19





 06:59 18:59


 


Intake Total 540 


 


Output Total 2 


 


Balance 538 














- Medications


Medications: 


                               Current Medications





Acetaminophen (Tylenol 325mg Tab)  650 mg PO Q6H PRN


   PRN Reason: Fever >100.4 F


   Last Admin: 02/08/19 18:27 Dose:  650 mg


Arformoterol Tartrate (Brovana)  15 mcg IH [id number] [facility]


   Last Admin: 02/09/19 20:12 Dose:  15 mcg


Aspirin (Aspirin Chewable)  81 mg PO DAILY [facility]


   Last Admin: 02/09/19 09:25 Dose:  81 mg


Atorvastatin Calcium (Lipitor)  20 mg PO DAILY [facility]


   Last Admin: 02/09/19 09:25 Dose:  20 mg


Benzonatate (Tessalon Perles)  200 mg PO Q8H [facility]


   Last Admin: 02/10/19 02:35 Dose:  Not Given


Budesonide (Pulmicort Respules)  0.25 mg IH [id number] [facility]


   Last Admin: 02/09/19 20:12 Dose:  0.25 mg


Enoxaparin Sodium (Lovenox)  40 mg SC DAILY [facility]; Protocol


   Last Admin: 02/09/19 09:24 Dose:  40 mg


Furosemide (Lasix)  20 mg PO DAILY [facility]


   Last Admin: 02/09/19 09:23 Dose:  20 mg


Gabapentin (Neurontin)  800 mg PO TID [facility]


   Last Admin: 02/09/19 18:28 Dose:  800 mg


Metoprolol Tartrate (Lopressor)  50 mg PO BID [facility]


   Last Admin: 02/09/19 18:35 Dose:  50 mg


Naproxen (Anaprox Ds)  550 mg PO BID [facility]


   Last Admin: 02/09/19 18:28 Dose:  550 mg


Ondansetron HCl (Zofran Inj)  4 mg IVP Q6H PRN


   PRN Reason: Nausea/Vomiting


   Last Admin: 02/08/19 18:27 Dose:  4 mg


Oseltamivir Phosphate (Tamiflu Cap)  75 mg PO BID [facility]; Protocol


   Stop: 02/13/19 14:20


   Last Admin: 02/09/19 18:28 Dose:  75 mg











- Labs


Labs: 


                                        





                                 02/09/19 07:36 





                                 02/09/19 07:36 





                                        











PT  13.6 SECONDS (9.4-12.5)  H  02/08/19  10:18    


 


INR  1.20   02/08/19  10:18    


 


APTT  28.6 Seconds (26.9-38.3)   02/08/19  10:18    














- Constitutional


Appears: Non-toxic, No Acute Distress





- Head Exam


Head Exam: NORMAL INSPECTION, NORMOCEPHALIC





- Eye Exam


Eye Exam: Normal appearance


Pupil Exam: NORMAL ACCOMODATION





- ENT Exam


ENT Exam: Mucous Membranes Moist, Normal Exam





- Neck Exam


Neck Exam: Full ROM, Normal Inspection





- Respiratory Exam


Respiratory Exam: Decreased Breath Sounds, Clear to Ausculation Bilateral, 

NORMAL BREATHING PATTERN





- Cardiovascular Exam


Cardiovascular Exam: Bradycardia, +S1, +S2


Additional comments: 





Telemetry 50's SB





- GI/Abdominal Exam


GI & Abdominal Exam: Soft, Normal Bowel Sounds





- Extremities Exam


Extremities Exam: Full ROM, Normal Capillary Refill





- Neurological Exam


Neurological Exam: Alert, Awake, Oriented x3





- Psychiatric Exam


Psychiatric exam: Normal Affect, Normal Mood





- Skin


Skin Exam: Dry, Normal Color, Warm





Assessment and Plan





- Assessment and Plan (Free Text)


Assessment: 








A 73 year old female who came in to the ER due to headache, coughing, chest 

discomfort when coughing,  body malaise, shortness of breath. History of 

coronary artery disease ,atrial fibrillation, CHF, fibromyalgia, tinnitus, 

anxiety, mitral and tricuspid valve repair in [facility] 2017. [place] 

[place] [facility] [place]. Echo was done at that time 

with severe mitral and tricuspid regurgitation. Cardiac cath was not done as she

was already scheduled at [facility] and open heart surgery at [facility]. She was being followed up by Dr. Brito  at [facility].This 

admission,  Chest X ray showed normal/unremarkable results, EKG showed NSR. 

Troponin normal x 2. Rule out acute coronary syndrome. No evidence of congestive

heart failure and atrial fibrillation on normal sinus rhythm now/bradycardia.  

Positive for influenza. On Tamiflu.  Cardiac status stable. Off telemetry.Echo 

done  and showed LVEF  65-70%, Trivial AR, S/p MV ring/repair.Moderate  MS c/w 

post MV repair, Trace tricuspid regurgitation RVSP 16 mmHg, no vegetation. 









































Plan: 








Echo done LVEF  65-70%, Trivial AR, S/p MV ring/repair.


         Moderate  MS c/w post MV repair, Trace tricuspid regurgitation 


         RVSP 16 mmHg, no vegetation.


Feels much better


Continue Tamiflu for influenza A


PRN Tessalon perles for coughing


Blood pressure stable


Heart rate controlled


Cardiac status stable


On ASA 81 mg daily, Lipitor 20 mg daily,


Lovenox 40 mg daily,Lasix 20 mg daily, Lopressor 50 mg BID


Tamiflu 75 mg BID


Continue current medications


Continue current treatment


Pulmonary on consult


Will follow up








Plan and treatment discussed with Dr. Flannery
Subjective





- Date & Time of Evaluation


Date of Evaluation: 02/11/19


Time of Evaluation: 06:20





- Subjective


Subjective: 














Awake, lying in bed, no distress,





Reason for consultation and follow up:  Cardiac evaluation of chest pain and 

shortness of breath.History of coronary artery disease ,atrial fibrillation, 

CHF, fibromyalgia, tinnitus, anxiety, mitral and tricuspid valve repair in [facility] 2017.





Seen and examined by me and Dr. Contreras








Objective





- Vital Signs/Intake and Output


Vital Signs (last 24 hours): 


                                        











Temp Pulse Resp BP Pulse Ox


 


 97.7 F   59 L  20   95/50 L  100 


 


 02/11/19 06:00  02/11/19 06:00  02/11/19 06:00  02/11/19 06:00  02/11/19 06:00








Intake and Output: 


                                        











 02/10/19 02/11/19





 18:59 06:59


 


Intake Total 1020 120


 


Output Total 400 0


 


Balance 620 120














- Medications


Medications: 


                               Current Medications





Acetaminophen (Tylenol 325mg Tab)  650 mg PO Q6H PRN


   PRN Reason: Fever >100.4 F


   Last Admin: 02/08/19 18:27 Dose:  650 mg


Arformoterol Tartrate (Brovana)  15 mcg IH [id number] [facility]


   Last Admin: 02/10/19 20:22 Dose:  15 mcg


Aspirin (Aspirin Chewable)  81 mg PO DAILY [facility]


   Last Admin: 02/10/19 09:13 Dose:  81 mg


Atorvastatin Calcium (Lipitor)  20 mg PO DAILY [facility]


   Last Admin: 02/10/19 09:13 Dose:  20 mg


Benzonatate (Tessalon Perles)  200 mg PO Q8H [facility]


   Last Admin: 02/11/19 00:43 Dose:  200 mg


Budesonide (Pulmicort Respules)  0.25 mg IH [id number] [facility]


   Last Admin: 02/10/19 20:22 Dose:  0.25 mg


Enoxaparin Sodium (Lovenox)  40 mg SC DAILY [facility]; Protocol


   Last Admin: 02/10/19 09:12 Dose:  40 mg


Famotidine (Pepcid)  40 mg PO HS [facility]


Furosemide (Lasix)  20 mg PO DAILY [facility]


   Last Admin: 02/10/19 09:13 Dose:  20 mg


Gabapentin (Neurontin)  800 mg PO TID [facility]


   Last Admin: 02/10/19 17:31 Dose:  800 mg


Methylprednisolone (Solu-Medrol)  20 mg IVP Q12 [facility]


Metoprolol Tartrate (Lopressor)  50 mg PO BID [facility]


   Last Admin: 02/10/19 17:31 Dose:  50 mg


Ondansetron HCl (Zofran Inj)  4 mg IVP Q6H PRN


   PRN Reason: Nausea/Vomiting


   Last Admin: 02/08/19 18:27 Dose:  4 mg


Oseltamivir Phosphate (Tamiflu Cap)  75 mg PO BID [facility]; Protocol


   Stop: 02/13/19 14:20


   Last Admin: 02/10/19 17:32 Dose:  75 mg











- Labs


Labs: 


                                        





                                 02/10/19 07:30 





                                 02/10/19 07:30 





                                        











PT  13.6 SECONDS (9.4-12.5)  H  02/08/19  10:18    


 


INR  1.20   02/08/19  10:18    


 


APTT  28.6 Seconds (26.9-38.3)   02/08/19  10:18    














- Constitutional


Appears: Non-toxic, No Acute Distress





- Head Exam


Head Exam: NORMAL INSPECTION, NORMOCEPHALIC





- Eye Exam


Eye Exam: Normal appearance


Pupil Exam: NORMAL ACCOMODATION





- ENT Exam


ENT Exam: Mucous Membranes Moist





- Respiratory Exam


Respiratory Exam: Decreased Breath Sounds, Rhonchi, NORMAL BREATHING PATTERN





- Cardiovascular Exam


Cardiovascular Exam: Bradycardia, +S1, +S2





- GI/Abdominal Exam


GI & Abdominal Exam: Soft, Normal Bowel Sounds





- Extremities Exam


Extremities Exam: Full ROM, Normal Capillary Refill





- Neurological Exam


Neurological Exam: Alert, Awake, Oriented x3





- Psychiatric Exam


Psychiatric exam: Normal Affect, Normal Mood





- Skin


Skin Exam: Dry, Normal Color, Warm





Assessment and Plan





- Assessment and Plan (Free Text)


Assessment: 








A 73 year old female who came in to the ER due to headache, coughing, chest 

discomfort when coughing,  body malaise, shortness of breath. History of 

coronary artery disease ,atrial fibrillation, CHF, fibromyalgia, tinnitus, 

anxiety, mitral and tricuspid valve repair in [facility] 2017. She was 

admitted at [facility] [place] 3/2017 for congestive heart failure. Echo was done at that time 

with severe mitral and tricuspid regurgitation. Cardiac cath was not done as she

was already scheduled at [facility] and open heart surgery at [facility]. She was being followed up by Dr. Brito  at [facility].This 

admission,  Chest X ray showed normal/unremarkable results, EKG showed NSR. 

Troponin normal x 2. Rule out acute coronary syndrome. No evidence of congestive

heart failure and atrial fibrillation on normal sinus rhythm now/bradycardia.   

 . Echo done  and showed LVEF  65-70%, Trivial AR, S/p MV ring/repair.Moderate  

MS c/w post MV repair, Trace tricuspid regurgitation RVSP 16 mmHg, no vegetatio

n. Positive for influenza. On Tamiflu. On droplet precaution. Cardiac status 

stable.















































Plan: 








Feels much better but still coughing


Continue Tamiflu for influenza A


On droplet precaution


PRN Tessalon perles for coughing


Blood pressure stable


Heart rate controlled


Cardiac status stable


On ASA 81 mg daily, Lipitor 20 mg daily,


Lovenox 40 mg daily,Lasix 20 mg daily, Lopressor 50 mg BID


Tamiflu 75 mg BID


Continue current medications


Continue current treatment


Pulmonary on consult


Will follow up








Plan and treatment discussed with Dr. Contreras
Subjective





- Date & Time of Evaluation


Date of Evaluation: 02/12/19


Time of Evaluation: 06:40





- Subjective


Subjective: 








Awake, lying in bed, no distress,feels better





Reason for consultation and follow up:  Cardiac evaluation of chest pain and 

shortness of breath.History of coronary artery disease ,atrial fibrillation, 

CHF, fibromyalgia, tinnitus, anxiety, mitral and tricuspid valve repair in [facility] 2017.





Seen and examined by me and Dr. Contreras








Objective





- Vital Signs/Intake and Output


Vital Signs (last 24 hours): 


                                        











Temp Pulse Resp BP Pulse Ox


 


 98.2 F   52 L  20   134/79   99 


 


 02/12/19 06:00  02/12/19 06:00  02/12/19 06:00  02/12/19 06:00  02/12/19 06:00








Intake and Output: 


                                        











 02/12/19 02/12/19





 06:59 18:59


 


Intake Total 120 360


 


Output Total 0 


 


Balance 120 360














- Medications


Medications: 


                               Current Medications





Acetaminophen (Tylenol 325mg Tab)  650 mg PO Q6H PRN


   PRN Reason: Fever >100.4 F


   Last Admin: 02/11/19 18:28 Dose:  650 mg


Arformoterol Tartrate (Brovana)  15 mcg IH [id number] [facility]


   Last Admin: 02/12/19 07:22 Dose:  15 mcg


Aspirin (Aspirin Chewable)  81 mg PO DAILY [facility]


   Last Admin: 02/11/19 11:13 Dose:  81 mg


Atorvastatin Calcium (Lipitor)  20 mg PO DAILY [facility]


   Last Admin: 02/11/19 11:19 Dose:  20 mg


Benzonatate (Tessalon Perles)  200 mg PO Q8H [facility]


   Last Admin: 02/12/19 03:30 Dose:  200 mg


Budesonide (Pulmicort Respules)  0.25 mg IH [id number] [facility]


   Last Admin: 02/12/19 07:23 Dose:  0.25 mg


Enoxaparin Sodium (Lovenox)  40 mg SC DAILY [facility]; Protocol


   Last Admin: 02/11/19 11:15 Dose:  40 mg


Famotidine (Pepcid)  40 mg PO HS [facility]


   Last Admin: 02/11/19 21:44 Dose:  40 mg


Furosemide (Lasix)  20 mg PO DAILY [facility]


   Last Admin: 02/11/19 11:13 Dose:  20 mg


Gabapentin (Neurontin)  800 mg PO TID [facility]


   Last Admin: 02/11/19 18:15 Dose:  800 mg


Methylprednisolone (Solu-Medrol)  20 mg IVP Q12 [facility]


   Last Admin: 02/11/19 21:45 Dose:  20 mg


Metoprolol Tartrate (Lopressor)  50 mg PO BID [facility]


   Last Admin: 02/11/19 18:15 Dose:  50 mg


Ondansetron HCl (Zofran Inj)  4 mg IVP Q6H PRN


   PRN Reason: Nausea/Vomiting


   Last Admin: 02/08/19 18:27 Dose:  4 mg


Oseltamivir Phosphate (Tamiflu Cap)  30 mg PO BID [facility]; Protocol


   Stop: 02/13/19 14:20


   Last Admin: 02/11/19 18:16 Dose:  30 mg











- Labs


Labs: 


                                        





                                 02/10/19 07:30 





                                 02/12/19 07:05 





                                        











PT  13.6 SECONDS (9.4-12.5)  H  02/08/19  10:18    


 


INR  1.20   02/08/19  10:18    


 


APTT  28.6 Seconds (26.9-38.3)   02/08/19  10:18    














- Constitutional


Appears: Non-toxic, No Acute Distress





- Head Exam


Head Exam: NORMAL INSPECTION, NORMOCEPHALIC





- Eye Exam


Eye Exam: Normal appearance


Pupil Exam: NORMAL ACCOMODATION





- ENT Exam


ENT Exam: Mucous Membranes Moist, Normal Exam





- Respiratory Exam


Respiratory Exam: Decreased Breath Sounds, Clear to Ausculation Bilateral, 

NORMAL BREATHING PATTERN





- Cardiovascular Exam


Cardiovascular Exam: +S1, +S2





- GI/Abdominal Exam


GI & Abdominal Exam: Soft, Normal Bowel Sounds





- Extremities Exam


Extremities Exam: Full ROM, Normal Capillary Refill





- Neurological Exam


Neurological Exam: Alert, Awake, Oriented x3





- Psychiatric Exam


Psychiatric exam: Normal Affect, Normal Mood





- Skin


Skin Exam: Dry, Normal Color, Warm





Assessment and Plan





- Assessment and Plan (Free Text)


Assessment: 








A 73 year old female who came in to the ER due to headache, coughing, chest 

discomfort when coughing,  body malaise, shortness of breath. History of 

coronary artery disease ,atrial fibrillation, CHF, fibromyalgia, tinnitus, 

anxiety, mitral and tricuspid valve repair in [facility] 2017. [place] 

[place] [facility] [place]. Echo was done at that time 

with severe mitral and tricuspid regurgitation. Cardiac cath was not done as she

was already scheduled at [facility] and open heart surgery at [facility]. She was being followed up by Dr. Brito  at [facility].This 

admission,  Chest X ray showed normal/unremarkable results, EKG showed NSR. 

Troponin normal x 2. Rule out acute coronary syndrome. No evidence of congestive

heart failure and atrial fibrillation on normal sinus rhythm now/bradycardia.   

 . Echo done  and showed LVEF  65-70%, Trivial AR, S/p MV ring/repair.Moderate  

MS c/w post MV repair, Trace tricuspid regurgitation RVSP 16 mmHg, no 

vegetation. Positive for influenza. On Tamiflu. On droplet precaution. Cardiac 

status stable. Discontinued telemetry.





















































Plan: 





Cardiac status stable


Feels much better


Continue Tamiflu for influenza A


On droplet precaution


PRN Tessalon perles for coughing


Blood pressure stable


Heart rate controlled


On ASA 81 mg daily, Lipitor 20 mg daily,


Lovenox 40 mg daily,Lasix 20 mg daily, Lopressor 50 mg BID


Tamiflu 30 mg BID, Solumedrol 20 mg BID,


Continue current medications


Continue current treatment


Pulmonary on consult


Discharge planning 


Will follow up








Plan and treatment discussed with Dr. Contreras
Subjective





- Date & Time of Evaluation


Date of Evaluation: 02/14/19


Time of Evaluation: 06:25





- Subjective


Subjective: 








Awake, lying in bed, no distress,wanted to go home





Reason for consultation and follow up:  Cardiac evaluation of chest pain and 

shortness of breath.History of coronary artery disease ,atrial fibrillation, 

CHF, fibromyalgia, tinnitus, anxiety, mitral and tricuspid valve repair in [facility] 2017.





Seen and examined by me and Dr. Contreras











Objective





- Vital Signs/Intake and Output


Vital Signs (last 24 hours): 


                                        











Temp Pulse Resp BP Pulse Ox


 


 97.8 F   62   20   135/63   96 


 


 02/14/19 06:00  02/14/19 06:00  02/14/19 06:00  02/14/19 06:00  02/14/19 06:00








Intake and Output: 


                                        











 02/14/19 02/14/19





 06:59 18:59


 


Intake Total 240 


 


Balance 240 














- Medications


Medications: 


                               Current Medications





Acetaminophen (Tylenol 325mg Tab)  650 mg PO Q6H PRN


   PRN Reason: Fever >100.4 F


   Last Admin: 02/11/19 18:28 Dose:  650 mg


Apixaban (Eliquis)  2.5 mg PO BID [facility]; Protocol


   Last Admin: 02/13/19 19:25 Dose:  2.5 mg


Arformoterol Tartrate (Brovana)  15 mcg IH [id number] [facility]


   Last Admin: 02/13/19 19:34 Dose:  15 mcg


Aspirin (Aspirin Chewable)  81 mg PO DAILY [facility]


   Last Admin: 02/13/19 10:20 Dose:  81 mg


Atorvastatin Calcium (Lipitor)  20 mg PO DAILY [facility]


   Last Admin: 02/13/19 10:02 Dose:  20 mg


Benzonatate (Tessalon Perles)  200 mg PO Q8H [facility]


   Last Admin: 02/14/19 02:30 Dose:  200 mg


Budesonide (Pulmicort Respules)  0.25 mg IH [id number] [facility]


   Last Admin: 02/13/19 19:34 Dose:  0.25 mg


Famotidine (Pepcid)  20 mg PO HS [facility]


   Last Admin: 02/13/19 21:16 Dose:  20 mg


Furosemide (Lasix)  20 mg PO DAILY [facility]


   Last Admin: 02/13/19 10:20 Dose:  20 mg


Gabapentin (Neurontin)  800 mg PO TID [facility]


   Last Admin: 02/13/19 19:22 Dose:  800 mg


Methylprednisolone (Solu-Medrol)  20 mg IVP Q12 [facility]


   Last Admin: 02/13/19 21:16 Dose:  20 mg


Metoprolol Tartrate (Lopressor)  50 mg PO BID [facility]


   Last Admin: 02/13/19 19:23 Dose:  Not Given











- Labs


Labs: 


                                        





                                 02/10/19 07:30 





                                 02/12/19 07:05 





                                        











PT  13.6 SECONDS (9.4-12.5)  H  02/08/19  10:18    


 


INR  1.20   02/08/19  10:18    


 


APTT  28.6 Seconds (26.9-38.3)   02/08/19  10:18    














- Constitutional


Appears: Non-toxic, No Acute Distress





- Head Exam


Head Exam: NORMAL INSPECTION, NORMOCEPHALIC





- Eye Exam


Eye Exam: Normal appearance


Pupil Exam: NORMAL ACCOMODATION





- ENT Exam


ENT Exam: Mucous Membranes Moist, Normal Exam





- Respiratory Exam


Respiratory Exam: Decreased Breath Sounds, Rhonchi, NORMAL BREATHING PATTERN





- Cardiovascular Exam


Cardiovascular Exam: +S1, +S2





- GI/Abdominal Exam


GI & Abdominal Exam: Soft, Normal Bowel Sounds





- Extremities Exam


Extremities Exam: Full ROM, Normal Capillary Refill





- Neurological Exam


Neurological Exam: Alert, Awake, Oriented x3





- Psychiatric Exam


Psychiatric exam: Normal Affect, Normal Mood





- Skin


Skin Exam: Dry, Normal Color, Warm





Assessment and Plan





- Assessment and Plan (Free Text)


Assessment: 








A 73 year old female who came in to the ER due to headache, coughing, chest 

discomfort when coughing,  body malaise, shortness of breath. History of 

coronary artery disease ,atrial fibrillation, CHF, fibromyalgia, tinnitus, 

anxiety, mitral and tricuspid valve repair in [facility] 2017. She was 

admitted at [facility] [place] 3/2017 for congestive heart failure. Echo was done at that time 

with severe mitral and tricuspid regurgitation. Cardiac cath was not done as she

was already scheduled at [facility] and open heart surgery at [facility]. She was being followed up by Dr. Brito  at [facility].This 

admission,  Chest X ray showed normal/unremarkable results, EKG showed NSR. T

roponin normal x 2. Rule out acute coronary syndrome. No evidence of congestive 

heart failure and atrial fibrillation on normal sinus rhythm now/bradycardia.   

 . Echo done  and showed LVEF  65-70%, Trivial AR, S/p MV ring/repair.Moderate  

MS c/w post MV repair, Trace tricuspid regurgitation RVSP 16 mmHg, no 

vegetation. Positive isolated right tibial DVT, On Eliquis.  Positive for 

influenza. On Tamiflu. On droplet precaution. Cardiac status stable. Pulmonary 

on consult.















































Plan: 





On droplet precaution


Cardiac status stable


Feels much better


Completed Tamiflu


Blood pressure stable


Heart rate controlled


On ASA 81 mg daily, Lipitor 20 mg daily,Eliquis 2.5 mg BID


Lasix 20 mg daily, Lopressor 50 mg BID


 Solumedrol 20 mg BID,


Continue current medications


Continue current treatment


Pulmonary on consult


Discharge planning 


Repeat US of right leg to follow up DVT as out patient 


Will follow up








Plan and treatment discussed with Dr. Contreras
none